# Patient Record
Sex: FEMALE | Race: BLACK OR AFRICAN AMERICAN | NOT HISPANIC OR LATINO | Employment: FULL TIME | URBAN - METROPOLITAN AREA
[De-identification: names, ages, dates, MRNs, and addresses within clinical notes are randomized per-mention and may not be internally consistent; named-entity substitution may affect disease eponyms.]

---

## 2017-02-01 ENCOUNTER — TELEPHONE (OUTPATIENT)
Dept: OBSTETRICS AND GYNECOLOGY | Facility: CLINIC | Age: 42
End: 2017-02-01

## 2017-02-01 NOTE — TELEPHONE ENCOUNTER
----- Message from Ricci Lind MD sent at 2/1/2017  4:04 PM CST -----  Contact: self  Please make an appointment. MUST   CHECK   YOU  FIRST  BEFORE   ORDERING  MAMMOGRAM  ----- Message -----     From: Diamond Zhang MA     Sent: 2/1/2017   3:21 PM       To: Ricci Lind MD    Please submit mammo orders  ----- Message -----     From: Ashlee Del Valle     Sent: 2/1/2017  12:04 PM       To: Lelo Wynne I Staff    Pt request an mammogram order to be placed. Thanks!      Lm in regards to patient needing an appt before Dr Lind can submit Mammo orders. Left office number on voicemail so patient can schedule her annual appt. sal

## 2017-02-07 ENCOUNTER — TELEPHONE (OUTPATIENT)
Dept: OBSTETRICS AND GYNECOLOGY | Facility: CLINIC | Age: 42
End: 2017-02-07

## 2017-02-07 NOTE — TELEPHONE ENCOUNTER
----- Message from Gordon Barton sent at 2/7/2017  1:27 PM CST -----  Contact: Self  Pt called to check status of mammogram orders. Pt can be reached @[phone].

## 2017-02-22 ENCOUNTER — HOSPITAL ENCOUNTER (OUTPATIENT)
Dept: RADIOLOGY | Facility: HOSPITAL | Age: 42
Discharge: HOME | End: 2017-02-22
Attending: OBSTETRICS & GYNECOLOGY
Payer: COMMERCIAL

## 2017-02-22 ENCOUNTER — OFFICE VISIT (OUTPATIENT)
Dept: OBSTETRICS AND GYNECOLOGY | Facility: CLINIC | Age: 42
End: 2017-02-22
Payer: COMMERCIAL

## 2017-02-22 VITALS
WEIGHT: 163.13 LBS | DIASTOLIC BLOOD PRESSURE: 67 MMHG | HEIGHT: 63 IN | SYSTOLIC BLOOD PRESSURE: 123 MMHG | BODY MASS INDEX: 28.9 KG/M2

## 2017-02-22 DIAGNOSIS — Z00.00 ANNUAL PHYSICAL EXAM: Primary | ICD-10-CM

## 2017-02-22 DIAGNOSIS — Z01.419 ENCOUNTER FOR GYNECOLOGICAL EXAMINATION WITHOUT ABNORMAL FINDING: ICD-10-CM

## 2017-02-22 DIAGNOSIS — Z12.31 ENCOUNTER FOR SCREENING MAMMOGRAM FOR BREAST CANCER: ICD-10-CM

## 2017-02-22 DIAGNOSIS — Z11.3 SCREENING FOR STD (SEXUALLY TRANSMITTED DISEASE): ICD-10-CM

## 2017-02-22 DIAGNOSIS — N97.9 FEMALE FERTILITY PROBLEMS: ICD-10-CM

## 2017-02-22 DIAGNOSIS — Z00.00 ANNUAL PHYSICAL EXAM: ICD-10-CM

## 2017-02-22 PROCEDURE — 87480 CANDIDA DNA DIR PROBE: CPT

## 2017-02-22 PROCEDURE — 87591 N.GONORRHOEAE DNA AMP PROB: CPT

## 2017-02-22 PROCEDURE — 77067 SCR MAMMO BI INCL CAD: CPT | Mod: 26,,, | Performed by: RADIOLOGY

## 2017-02-22 PROCEDURE — 99999 PR PBB SHADOW E&M-EST. PATIENT-LVL III: CPT | Mod: PBBFAC,,, | Performed by: OBSTETRICS & GYNECOLOGY

## 2017-02-22 PROCEDURE — 1160F RVW MEDS BY RX/DR IN RCRD: CPT | Mod: S$GLB,,, | Performed by: OBSTETRICS & GYNECOLOGY

## 2017-02-22 PROCEDURE — 99396 PREV VISIT EST AGE 40-64: CPT | Mod: S$GLB,,, | Performed by: OBSTETRICS & GYNECOLOGY

## 2017-02-22 PROCEDURE — 77067 SCR MAMMO BI INCL CAD: CPT | Mod: TC

## 2017-02-22 NOTE — PROGRESS NOTES
Subjective:      Chief Complaint:    Chief Complaint   Patient presents with    Annual Exam       Menstrual History:    OB History      Para Term  AB TAB SAB Ectopic Multiple Living    1         1          Menarche age: 15     Patient's last menstrual period was 02/15/2017 (exact date).           Objective:        History of Present Illness AND  Examination detailed DICTATE:       HISTORY OF PRESENT ILLNESS:  The patient is 42 years of age.   1, para 1.    Here for yearly routine examination.  The patient requested a vaginal culture   screen.    PAST HISTORY:  Medical is negative.  The patient had multiple laparoscopies   because of pelvic scar tissue.  One pregnancy by vaginal delivery,   hystersalpingogram  was negative.  Pap smear  is negative.  The patient   has fertility problem unable to get pregnant.  Had regular normal cycles without   any difficulty.  No more bleeding monthly, so I doubt any ovulatory   dysfunction.  The last exam, I discussed possible evaluation; however, her    was not willing to do evaluation procedure.  The patient is still   interested in pregnancy, the pain as stated that multiple laparoscopies was   found to have pelvic scar tissue, so discussed the problem with the patient.  We   will refer her off for possible diagnostic laparoscopy to evaluate the pelvis   and see if there is any problem that will explain her infertility.      MARTHA  dd: 2017 10:37:10 (CST)  td: 2017 03:42:04 (CST)  Doc ID   #[id number]  Job ID #[id number]    CC:           Assessment:      Diagnosis: ANNUAL   EXAM   FERTILITY   PROBLEM       Plan:      Return in 12  months

## 2017-02-22 NOTE — MR AVS SNAPSHOT
"    [facility] [place] [facility]/ [facility]  [address] [facility] [address]  [address]  [place] [address]  Phone: [phone]                  Arpita Molina   2017 10:20 AM   Office Visit    Description:  Female : 1975   Provider:  Ricci Lind MD   Department:  [facility] [place] - OB/ GYN           Reason for Visit     Annual Exam           Diagnoses this Visit        Comments    Annual physical exam    -  Primary     Encounter for gynecological examination without abnormal finding         Screening for STD (sexually transmitted disease)         Female fertility problems                To Do List           Goals (5 Years of Data)     None      Follow-Up and Disposition     Return in about 2 weeks (around 3/8/2017).      [facility] On [facility]     [facility] On Call Nurse Care Line -  Assistance  Registered nurses in the [facility] provide clinical advisement, health education, appointment booking, and other advisory services.  Call for this free service at [phone].             Medications           Message regarding Medications     Verify the changes and/or additions to your medication regime listed below are the same as discussed with your clinician today.  If any of these changes or additions are incorrect, please notify your healthcare provider.             Verify that the below list of medications is an accurate representation of the medications you are currently taking.  If none reported, the list may be blank. If incorrect, please contact your healthcare provider. Carry this list with you in case of emergency.           Current Medications     M-M-R II, PF, 1,000-12,500 TCID50/0.5 mL injection     PRENATAL VITAMINS-IRON-FA ORAL Take 1 tablet by mouth once daily.             Clinical Reference Information           Your Vitals Were     BP Height Weight Last Period BMI    123/67 5' 3" (1.6 m) 74 kg (163 lb 2.3 oz) 02/15/2017 (Exact Date) 28.9 kg/m2      Blood Pressure          Most Recent Value    BP  123/67    "   Allergies as of 2/22/2017     No Known Allergies      Immunizations Administered on Date of Encounter - 2/22/2017     None      Orders Placed During Today's Visit      Normal Orders This Visit    Ambulatory Referral to Gynecology     C. trachomatis/N. gonorrhoeae by AMP DNA Cervix     Vaginosis Screen by DNA Probe     Future Labs/Procedures Expected by Expires    Mammo Digital Screening Bilat with CAD  2/22/2017 4/22/2018      [facility] Sign-Up     Activating your [facility] account is as easy as 1-2-3!     1) Visit [email or web address], select Sign Up Now, enter this activation code and your date of birth, then select Next.  [email or web address]  Expires: 4/8/2017 10:18 AM      2) Create a username and password to use when you visit [facility] in the future and select a security question in case you lose your password and select Next.    3) Enter your e-mail address and click Sign Up!    Additional Information  If you have questions, please e-mail [email or web address] or call [phone] to talk to our [facility] staff. Remember, [facility] is NOT to be used for urgent needs. For medical emergencies, dial 911.         Language Assistance Services     ATTENTION: Language assistance services are available, free of charge. Please call [phone].      ATENCIÓN: Si habla español, tiene a chase disposición servicios gratuitos de asistencia lingüística. Llame al [phone].     CHÚ Ý: N?u b?n nói Ti?ng Vi?t, có các d?ch v? h? tr? ngôn ng? mi?n phí dành cho b?n. G?i s? [phone].         [facility] - [facility]/ G[facility] complies with applicable Federal civil rights laws and does not discriminate on the basis of race, color, national origin, age, disability, or sex.

## 2017-02-23 LAB
CANDIDA RRNA VAG QL PROBE: NEGATIVE
G VAGINALIS RRNA GENITAL QL PROBE: POSITIVE
T VAGINALIS RRNA GENITAL QL PROBE: NEGATIVE

## 2017-02-24 LAB
C TRACH DNA SPEC QL NAA+PROBE: NEGATIVE
N GONORRHOEA DNA SPEC QL NAA+PROBE: NEGATIVE

## 2017-03-01 ENCOUNTER — TELEPHONE (OUTPATIENT)
Dept: OBSTETRICS AND GYNECOLOGY | Facility: CLINIC | Age: 42
End: 2017-03-01

## 2017-03-01 NOTE — TELEPHONE ENCOUNTER
Notes Recorded by Mazin Schroeder LPN on 3/1/2017 at 9:26 AM  CALL ATTEMPT TO MS ANDINO UNSUCCESSFUL, SHE HAS A VOICE MAIL BOX THAT HAS NOT BEEN SET UP, UNABLE TO LEAVE MESSAGE  ------

## 2017-03-01 NOTE — TELEPHONE ENCOUNTER
Notes Recorded by Mazin Schroeder LPN on 3/1/2017 at 10:06 AM  MS ANDINO RETURNED CALL TO OFFICE, INFORMED HER THAT DR SOMERS READ HER VAGINOSIS SCREEN AND IT IS NORMAL, PT STATED HER UNDERSTANDING  ------

## 2017-03-01 NOTE — TELEPHONE ENCOUNTER
Notes Recorded by Mazin Schroeder LPN on 3/1/2017 at 10:21 AM  SPOKE WITH MS SINA, INFORMED HER DR SOMERS READ HER MMG RESULTS AND THEY ARE NORMAL, PT STATED HER UNDERSTANDING  ------

## 2017-03-03 ENCOUNTER — OFFICE VISIT (OUTPATIENT)
Dept: OBSTETRICS AND GYNECOLOGY | Facility: CLINIC | Age: 42
End: 2017-03-03
Payer: COMMERCIAL

## 2017-03-03 VITALS
WEIGHT: 160 LBS | BODY MASS INDEX: 28.35 KG/M2 | SYSTOLIC BLOOD PRESSURE: 110 MMHG | DIASTOLIC BLOOD PRESSURE: 70 MMHG | HEIGHT: 63 IN | TEMPERATURE: 99 F

## 2017-03-03 DIAGNOSIS — Z31.61 PROCREATIVE COUNSELING AND ADVICE USING NATURAL FAMILY PLANNING: Primary | ICD-10-CM

## 2017-03-03 DIAGNOSIS — N80.9 ENDOMETRIOSIS DETERMINED BY LAPAROSCOPY: ICD-10-CM

## 2017-03-03 PROCEDURE — 99999 PR PBB SHADOW E&M-EST. PATIENT-LVL III: CPT | Mod: PBBFAC,,, | Performed by: OBSTETRICS & GYNECOLOGY

## 2017-03-03 PROCEDURE — 1160F RVW MEDS BY RX/DR IN RCRD: CPT | Mod: S$GLB,,, | Performed by: OBSTETRICS & GYNECOLOGY

## 2017-03-03 PROCEDURE — 99213 OFFICE O/P EST LOW 20 MIN: CPT | Mod: S$GLB,,, | Performed by: OBSTETRICS & GYNECOLOGY

## 2017-03-03 RX ORDER — ESTRADIOL 1 MG/1
3 TABLET ORAL EVERY 12 HOURS
Qty: 27 TABLET | Refills: 3 | Status: SHIPPED | OUTPATIENT
Start: 2017-03-03 | End: 2017-07-15

## 2017-03-03 RX ORDER — CLOMIPHENE CITRATE 50 MG/1
50 TABLET ORAL DAILY
Qty: 5 TABLET | Refills: 3 | Status: SHIPPED | OUTPATIENT
Start: 2017-03-03 | End: 2017-07-15

## 2017-03-03 NOTE — PROGRESS NOTES
Subjective:       Patient ID: Arpita Molina is a 42 y.o. female.    Chief Complaint:  Consult (Consult on surgery)      History of Present Illness  HPI  Patient referred by Dr Lelo Magaña history of endometriosis.  Status post laparoscopy x 2 with ablation of endometriotic implants.  Found to have some pelvic adhesions  HSG done on 2015 shows bilateral patent tubes   and patient with one child in .  No pain today.  Trying to get pregnant.    GYN & OB History  Patient's last menstrual period was 2017 (exact date).   Date of Last Pap: 2015    OB History    Para Term  AB SAB TAB Ectopic Multiple Living   1 1 1       1      # Outcome Date GA Lbr Livan/2nd Weight Sex Delivery Anes PTL Lv   1 Term 13 40w0d  3.714 kg (8 lb 3 oz) M Vag-Spont EPI N Y        Past Medical History:   Diagnosis Date    Anemia     not currently taking anything for it    Endometriosis        Past Surgical History:   Procedure Laterality Date    laproscopy  ,     for endometriosis    VAGINAL DELIVERY         Family History   Problem Relation Age of Onset    Hypertension Mother     Diabetes Mother        Social History     Social History    Marital status:      Spouse name: N/A    Number of children: N/A    Years of education: N/A     Social History Main Topics    Smoking status: Never Smoker    Smokeless tobacco: Never Used    Alcohol use No    Drug use: No    Sexual activity: Yes     Partners: Male     Other Topics Concern    None     Social History Narrative    He is in Purchasing/Procurement for [employer]    She is working in [employer]     x 6 years, since        Current Outpatient Prescriptions   Medication Sig Dispense Refill    clomiPHENE (CLOMID) 50 mg tablet Take 1 tablet (50 mg total) by mouth once daily. 5 tablet 3    estradiol (ESTRACE) 1 MG tablet Take 3 tablets (3 mg total) by mouth every 12 (twelve) hours. 27 tablet 3     No current  facility-administered medications for this visit.        Review of patient's allergies indicates:  No Known Allergies    Review of Systems  Review of Systems   Constitutional: Negative for activity change, appetite change, chills, fatigue, fever and unexpected weight change.   HENT: Negative for mouth sores.    Respiratory: Negative for cough, shortness of breath and wheezing.    Cardiovascular: Negative for chest pain and palpitations.   Gastrointestinal: Negative for abdominal pain, bloating, blood in stool, constipation, nausea and vomiting.   Endocrine: Negative for diabetes and hot flashes.   Genitourinary: Positive for dyspareunia, pelvic pain and dysmenorrhea. Negative for dysuria, frequency, hematuria, menorrhagia, menstrual problem, urgency, vaginal bleeding, vaginal discharge, vaginal pain, urinary incontinence, postcoital bleeding and vaginal odor.   Musculoskeletal: Negative for back pain and myalgias.   Skin:  Negative for rash.   Neurological: Negative for seizures and headaches.   Psychiatric/Behavioral: Negative for depression and sleep disturbance. The patient is not nervous/anxious.    Breast: Negative for breast mass, breast pain and nipple discharge          Objective:    Physical Exam:   Constitutional: She appears well-developed and well-nourished. No distress.    HENT:   Head: Normocephalic and atraumatic.    Eyes: EOM are normal.    Neck: Normal range of motion.    Cardiovascular: Normal rate.     Pulmonary/Chest: Effort normal. No respiratory distress.                  Musculoskeletal: Normal range of motion.       Neurological: She is alert.    Skin: Skin is warm and dry.    Psychiatric: She has a normal mood and affect.          Assessment:        1. Procreative counseling and advice using natural family planning    2. Endometriosis determined by laparoscopy              Plan:      I have extensively discussed with the patient regarding her condition  She has no pain at this time.  She  would like to get pregnant soon.  She has been trying for the past 6-7 months.  We discussed laparoscopy.  I was not sure if we would make a big difference with surgery with patient basically asymptomatic and her recent HSG with patent tubes.  Her three-and-half years-old child was with her .  She is now 42 years old.  I think we should try her on clomiphene to see if we can help with ovulation and timing of intercourse   After extensive discussion, clomiphene given with specific instructions on how to take pills.    She will be back for follow-up in about 3 months or when she has a positive pregnancy test.  She will continue with over-the-counter prenatal vitamins.

## 2017-03-03 NOTE — LETTER
March 3, 2017      Ricci Lind MD  [address]  [address]  [place] [address]           [facility] [place] - [facility]/ GYN  [address]  [address]  [place] [address]  Phone: [phone]          Patient: Arpita Molina   MR Number: [id number]   YOB: 1975   Date of Visit: 3/3/2017       Dear Dr. Ricci Lind:    Thank you for referring Arpita Molina to me for evaluation. Attached you will find relevant portions of my assessment and plan of care.    If you have questions, please do not hesitate to call me. I look forward to following Arpita Molina along with you.    Sincerely,    Thiago Schmidt MD    Enclosure  CC:  No Recipients    If you would like to receive this communication electronically, please contact [email or web address] or [phone] to request more information on [employer] Link access.    For providers and/or their staff who would like to refer a patient to [facility], please contact us through our one-stop-shop provider referral line, [facility], at [phone].    If you feel you have received this communication in error or would no longer like to receive these types of communications, please e-mail [email or web address]

## 2017-03-03 NOTE — MR AVS SNAPSHOT
[facility] - OB/ GYN  [address]  [address]  [place] [address]  Phone: [phone]                  Arpita Molina   3/3/2017 9:00 AM   Office Visit    Description:  Female : 1975   Provider:  Thiago Schmidt MD   Department:  [facility] - OB/ GYN           Reason for Visit     Consult           Diagnoses this Visit        Comments    Procreative counseling and advice using natural family planning    -  Primary     Endometriosis determined by laparoscopy                To Do List           Goals (5 Years of Data)     None      Follow-Up and Disposition     Return in about 3 months (around 6/3/2017).       These Medications        Disp Refills Start End    clomiPHENE (CLOMID) 50 mg tablet 5 tablet 3 3/3/2017     Take 1 tablet (50 mg total) by mouth once daily. - Oral    Pharmacy: [id number] - [place], [place] - [address] Ph #: [phone]       Notes to Pharmacy: From cycle day #3-7    estradiol (ESTRACE) 1 MG tablet 27 tablet 3 3/3/2017     Take 3 tablets (3 mg total) by mouth every 12 (twelve) hours. - Oral    Pharmacy: [facility] [address] [place] [address] [address] Ph #: [phone]       Notes to Pharmacy: From cycle day #8 through the morning of cycle day #12      [facility] On Call     [facility] On Call Nurse Care Line -  Assistance  Registered nurses in the [facility] provide clinical advisement, health education, appointment booking, and other advisory services.  Call for this free service at [phone].             Medications           Message regarding Medications     Verify the changes and/or additions to your medication regime listed below are the same as discussed with your clinician today.  If any of these changes or additions are incorrect, please notify your healthcare provider.        START taking these NEW medications        Refills    clomiPHENE (CLOMID) 50 mg tablet 3    Sig: Take 1 tablet (50 mg total) by mouth once daily.     "Class: Normal    Route: Oral    estradiol (ESTRACE) 1 MG tablet 3    Sig: Take 3 tablets (3 mg total) by mouth every 12 (twelve) hours.    Class: Normal    Route: Oral      STOP taking these medications     M-M-R II, PF, 1,000-12,500 TCID50/0.5 mL injection     PRENATAL VITAMINS-IRON-FA ORAL Take 1 tablet by mouth once daily.             Verify that the below list of medications is an accurate representation of the medications you are currently taking.  If none reported, the list may be blank. If incorrect, please contact your healthcare provider. Carry this list with you in case of emergency.           Current Medications     clomiPHENE (CLOMID) 50 mg tablet Take 1 tablet (50 mg total) by mouth once daily.    estradiol (ESTRACE) 1 MG tablet Take 3 tablets (3 mg total) by mouth every 12 (twelve) hours.           Clinical Reference Information           Your Vitals Were     BP Temp Height Weight Last Period BMI    110/70 (BP Location: Right arm, Patient Position: Sitting, BP Method: Manual) 98.6 °F (37 °C) (Oral) 5' 3" (1.6 m) 72.6 kg (160 lb) 02/09/2017 (Exact Date) 28.34 kg/m2      Blood Pressure          Most Recent Value    BP  110/70      Allergies as of 3/3/2017     No Known Allergies      Immunizations Administered on Date of Encounter - 3/3/2017     None      Language Assistance Services     ATTENTION: Language assistance services are available, free of charge. Please call [phone].      ATENCIÓN: Si habla español, tiene a chase disposición servicios gratuitos de asistencia lingüística. Llame al [phone].     [facility] Ý: N?u b?n nói Ti?ng Vi?t, có các d?ch v? h? tr? ngôn ng? mi?n phí dành cho b?n. G?i s? [phone].         [facility] complies with applicable Federal civil rights laws and does not discriminate on the basis of race, color, national origin, age, disability, or sex.        "

## 2017-05-29 PROBLEM — Z00.00 ANNUAL PHYSICAL EXAM: Status: RESOLVED | Noted: 2017-02-22 | Resolved: 2017-05-29

## 2017-07-14 ENCOUNTER — NURSE TRIAGE (OUTPATIENT)
Dept: ADMINISTRATIVE | Facility: CLINIC | Age: 42
End: 2017-07-14

## 2017-07-14 ENCOUNTER — TELEPHONE (OUTPATIENT)
Dept: OBSTETRICS AND GYNECOLOGY | Facility: CLINIC | Age: 42
End: 2017-07-14

## 2017-07-14 NOTE — TELEPHONE ENCOUNTER
Reason for Disposition   Patient wants to be seen    Protocols used: ST VAGINAL BLEEDING - [id number]    Pt reports abdnormal vaginal bleeding in between periods. Her menstrual cycle is normally very regular. Heavy bleeding started this morning. Denies severe or constant abdominal but does report intermittent cramping. Denies dizziness.    Per protocol patient advised to follow up with GYN today

## 2017-07-15 ENCOUNTER — HOSPITAL ENCOUNTER (EMERGENCY)
Facility: HOSPITAL | Age: 42
Discharge: HOME | End: 2017-07-15
Attending: EMERGENCY MEDICINE
Payer: COMMERCIAL

## 2017-07-15 VITALS
WEIGHT: 160 LBS | DIASTOLIC BLOOD PRESSURE: 84 MMHG | HEART RATE: 74 BPM | BODY MASS INDEX: 28.35 KG/M2 | SYSTOLIC BLOOD PRESSURE: 138 MMHG | OXYGEN SATURATION: 99 % | RESPIRATION RATE: 17 BRPM | HEIGHT: 63 IN | TEMPERATURE: 98 F

## 2017-07-15 DIAGNOSIS — N93.8 DYSFUNCTIONAL UTERINE BLEEDING: Primary | ICD-10-CM

## 2017-07-15 LAB
B-HCG UR QL: NEGATIVE
BACTERIA #/AREA URNS HPF: ABNORMAL /HPF
BACTERIA GENITAL QL WET PREP: ABNORMAL
BASOPHILS # BLD AUTO: 0.01 K/UL
BASOPHILS NFR BLD: 0.2 %
BILIRUB UR QL STRIP: NEGATIVE
CLARITY UR: ABNORMAL
CLUE CELLS VAG QL WET PREP: ABNORMAL
COLOR UR: YELLOW
CTP QC/QA: YES
DIFFERENTIAL METHOD: NORMAL
EOSINOPHIL # BLD AUTO: 0 K/UL
EOSINOPHIL NFR BLD: 0.5 %
ERYTHROCYTE [DISTWIDTH] IN BLOOD BY AUTOMATED COUNT: 12.5 %
FILAMENT FUNGI VAG WET PREP-#/AREA: ABNORMAL
GLUCOSE UR QL STRIP: NEGATIVE
HCT VFR BLD AUTO: 37.4 %
HGB BLD-MCNC: 12.5 G/DL
HGB UR QL STRIP: ABNORMAL
KETONES UR QL STRIP: NEGATIVE
LEUKOCYTE ESTERASE UR QL STRIP: NEGATIVE
LYMPHOCYTES # BLD AUTO: 1.6 K/UL
LYMPHOCYTES NFR BLD: 24.4 %
MCH RBC QN AUTO: 30.2 PG
MCHC RBC AUTO-ENTMCNC: 33.4 %
MCV RBC AUTO: 90 FL
MICROSCOPIC COMMENT: ABNORMAL
MONOCYTES # BLD AUTO: 0.6 K/UL
MONOCYTES NFR BLD: 9.5 %
NEUTROPHILS # BLD AUTO: 4.4 K/UL
NEUTROPHILS NFR BLD: 65.2 %
NITRITE UR QL STRIP: NEGATIVE
PH UR STRIP: 8 [PH] (ref 5–8)
PLATELET # BLD AUTO: 323 K/UL
PMV BLD AUTO: 11.2 FL
PROT UR QL STRIP: NEGATIVE
RBC # BLD AUTO: 4.14 M/UL
RBC #/AREA URNS HPF: >100 /HPF (ref 0–4)
SP GR UR STRIP: 1.02 (ref 1–1.03)
SPECIMEN SOURCE: ABNORMAL
SQUAMOUS #/AREA URNS HPF: 3 /HPF
T VAGINALIS GENITAL QL WET PREP: ABNORMAL
URN SPEC COLLECT METH UR: ABNORMAL
UROBILINOGEN UR STRIP-ACNC: NEGATIVE EU/DL
WBC # BLD AUTO: 6.65 K/UL
WBC #/AREA VAG WET PREP: ABNORMAL
YEAST GENITAL QL WET PREP: ABNORMAL

## 2017-07-15 PROCEDURE — 81000 URINALYSIS NONAUTO W/SCOPE: CPT

## 2017-07-15 PROCEDURE — 87591 N.GONORRHOEAE DNA AMP PROB: CPT

## 2017-07-15 PROCEDURE — 87210 SMEAR WET MOUNT SALINE/INK: CPT

## 2017-07-15 PROCEDURE — 99284 EMERGENCY DEPT VISIT MOD MDM: CPT | Mod: 25

## 2017-07-15 PROCEDURE — 85025 COMPLETE CBC W/AUTO DIFF WBC: CPT

## 2017-07-15 PROCEDURE — 81025 URINE PREGNANCY TEST: CPT | Performed by: EMERGENCY MEDICINE

## 2017-07-15 NOTE — ED PROVIDER NOTES
Encounter Date: 7/15/2017    SCRIBE #1 NOTE: I, Caron Dailey, am scribing for, and in the presence of, Janette Gooden PA-C. Other sections scribed: HPI/ROS.       History     Chief Complaint   Patient presents with    Vaginal Bleeding     Pt. presents with abnormal vaginal bleeding that began last Thursday. Pt. reports that her cycle began and then should have ended but continued. Pt. states bleeding is not heavy and using a pad every 2 hrs.      CC: Vaginal bleeding    Pt is a 42 y.o. female w/ anemia and hx of endometriosis presenting to the ED c/o abnormal vaginal bleeding. Pt's normal menstrual cycle ended last Wednesday, but she began bleeding this Thursday (7/13/17). Pt reports that it is like a normal period. Pt also reports associated weakness and lack of appetite, as well as some mild lower back pain and mild cramping (2/10).     Pt reports when she had endometriosis in the past, it never caused her to bleed. Pt sees OB/GYN Dr. Schmidt, who told her in January that she has a pea-sized fibroid found via U/S. Pt reports her menstrual cycle is very regular, every 26 days. Pt denies any new sex partners, fever, or N/V.        The history is provided by the patient.     Review of patient's allergies indicates:  No Known Allergies  Past Medical History:   Diagnosis Date    Anemia     not currently taking anything for it    Endometriosis      Past Surgical History:   Procedure Laterality Date    laproscopy  2003, 2009    for endometriosis    VAGINAL DELIVERY       Family History   Problem Relation Age of Onset    Hypertension Mother     Diabetes Mother      Social History   Substance Use Topics    Smoking status: Never Smoker    Smokeless tobacco: Never Used    Alcohol use Yes      Comment: occasionally drinks wine     Review of Systems   Constitutional: Positive for appetite change (+) decreased. Negative for fever.   HENT: Negative for rhinorrhea and sinus pressure.    Eyes: Negative for visual  disturbance.   Respiratory: Negative for chest tightness.    Cardiovascular: Negative for chest pain.   Gastrointestinal: Negative for diarrhea, nausea and vomiting.   Genitourinary: Positive for vaginal bleeding. Negative for dysuria and hematuria.        (+) mild cramping   Musculoskeletal: Positive for back pain.   Skin: Negative for rash and wound.   Neurological: Positive for weakness. Negative for headaches.       Physical Exam     Initial Vitals [07/15/17 1159]   BP Pulse Resp Temp SpO2   (!) 147/72 76 17 97.8 °F (36.6 °C) 99 %      MAP       97         Physical Exam    ED Course   Procedures  Labs Reviewed   POCT URINE PREGNANCY                        Scribe Attestation:   Scribe #1: I performed the above scribed service and the documentation accurately describes the services I performed. I attest to the accuracy of the note.    Attending Attestation:           Physician Attestation for Scribe:  Physician Attestation Statement for Scribe #1: I, Janette Gooden PA-C, reviewed documentation, as scribed by Caron Dailey in my presence, and it is both accurate and complete.                 ED Course     Clinical Impression:   There were no encounter diagnoses.

## 2017-07-15 NOTE — DISCHARGE INSTRUCTIONS
Follow up with your OB/GYN.  Return to the ED for vaginal bleeding greater than one pad per hour.  Drink plenty of fluids.

## 2017-07-15 NOTE — ED TRIAGE NOTES
Pt. Comes to the ER with abnormal vaginal bleeding. Patient states her cycled stopped last Wednesday (7/5/17). Patient states she started bleeding again this Thursday (7/13). Patient complains of mild cramping pains. Patient states the blood is a normal cycle. Patient states she is going through 1 pad every 1.5-2 hours.    Patient OBGYN is Lelo (here at [facility]).

## 2017-07-15 NOTE — ED PROVIDER NOTES
Encounter Date: 7/15/2017    SCRIBE #1 NOTE: I, Caron Dailey, am scribing for, and in the presence of, Janette Gooden PA-C. Other sections scribed: HPI/ROS.       History     Chief Complaint   Patient presents with    Vaginal Bleeding     Pt. presents with abnormal vaginal bleeding that began last Thursday. Pt. reports that her cycle began and then should have ended but continued. Pt. states bleeding is not heavy and using a pad every 2 hrs.      CC: Vaginal bleeding     Pt is a 42 y.o. female w/ anemia and hx of endometriosis presenting to the ED c/o abnormal vaginal bleeding. Pt's normal menstrual cycle ended last Wednesday, but she began bleeding this Thursday (7/13/17). Pt reports that it is like a normal period. Pt also reports associated weakness and lack of appetite, as well as some mild lower back pain and mild cramping (2/10).      Pt reports when she had endometriosis in the past, it never caused her to bleed. Pt sees OB/GYN Dr. Schmidt, who told her in January that she has a pea-sized fibroid found via U/S. Pt reports her menstrual cycle is very regular, every 26 days. Pt denies any new sex partners, fever, or N/V.         The history is provided by the patient.     Review of patient's allergies indicates:  No Known Allergies  Past Medical History:   Diagnosis Date    Anemia     not currently taking anything for it    Endometriosis      Past Surgical History:   Procedure Laterality Date    laproscopy  2003, 2009    for endometriosis    VAGINAL DELIVERY       Family History   Problem Relation Age of Onset    Hypertension Mother     Diabetes Mother      Social History   Substance Use Topics    Smoking status: Never Smoker    Smokeless tobacco: Never Used    Alcohol use Yes      Comment: occasionally drinks wine     Review of Systems   Constitutional: Positive for appetite change (+) decreased.   HENT: Negative for rhinorrhea and sore throat.    Eyes: Negative for visual disturbance.    Respiratory: Negative for shortness of breath.    Cardiovascular: Negative for chest pain.   Gastrointestinal: Negative for diarrhea, nausea and vomiting.   Genitourinary: Positive for vaginal bleeding.        (+) mild cramping   Musculoskeletal: Positive for back pain.   Skin: Negative for rash and wound.   Neurological: Positive for weakness.       Physical Exam     Initial Vitals [07/15/17 1159]   BP Pulse Resp Temp SpO2   (!) 147/72 76 17 97.8 °F (36.6 °C) 99 %      MAP       97         Physical Exam    Nursing note and vitals reviewed.  Constitutional: She appears well-developed and well-nourished. She is not diaphoretic. No distress.   HENT:   Head: Normocephalic and atraumatic.   Right Ear: External ear normal.   Left Ear: External ear normal.   Nose: Nose normal.   Mouth/Throat: Oropharynx is clear and moist.   Eyes: EOM are normal. Pupils are equal, round, and reactive to light.   Neck: Normal range of motion. Neck supple.   Cardiovascular: Normal rate and regular rhythm.   No murmur heard.  Pulmonary/Chest: Breath sounds normal. No respiratory distress. She has no wheezes. She has no rhonchi. She has no rales.   Abdominal: Soft. Bowel sounds are normal. She exhibits no distension. There is no tenderness. There is no rebound and no guarding.   Genitourinary:   Genitourinary Comments: There is a small amount of blood noted in the vaginal vault.  There is no hemorrhaging noted.  There are no external genital lesions noted.  The cervix is closed.   Musculoskeletal: Normal range of motion.   Neurological: She is alert and oriented to person, place, and time. No cranial nerve deficit or sensory deficit.   Skin: Skin is warm. No rash noted. No erythema.         ED Course   Procedures  Labs Reviewed   VAGINAL SCREEN - Abnormal; Notable for the following:        Result Value    WBC - Vaginal Screen Rare (*)     Bacteria - Vaginal Screen Few (*)     All other components within normal limits   URINALYSIS -  Abnormal; Notable for the following:     Appearance, UA Hazy (*)     Occult Blood UA 3+ (*)     All other components within normal limits   URINALYSIS MICROSCOPIC - Abnormal; Notable for the following:     RBC, UA >100 (*)     All other components within normal limits   C. TRACHOMATIS/N. GONORRHOEAE BY AMP DNA   CBC W/ AUTO DIFFERENTIAL   POCT URINE PREGNANCY             Medical Decision Making:   Differential Diagnosis:   This is an urgent evaluation of a 42-year-old female who presents to the emergency department complaining of vaginal bleeding.  She states that her period ended one week ago and she started bleeding again on Thursday.    Previous medical records were obtained and reviewed.  This patient has a history of anemia and endometriosis.    The patient is currently afebrile and nontoxic in appearance.  Her blood pressure is mildly elevated at 147/72.  On physical exam, there is no abdominal tenderness that could be elicited.  There is no CVA tenderness noted.  On pelvic exam, there is a small amount of blood noted in the vaginal vault.  There is no hemorrhaging noted.  There are no external genital lesions noted.  The cervix is closed.  The remaining physical exam is unremarkable.  A CBC was performed which revealed no leukocytosis or anemia.  Vaginal screen is with rare bacteria and few white blood cells.  Gen-Probe for gonorrhea and chlamydia was sent and is pending.  On urinalysis there is no evidence of urinary tract infection.  Urine pregnancy test is negative.  I carefully considered but doubt ectopic pregnancy, tubo-ovarian abscess, ovarian torsion, uterine fibroids.  I'm unsure the etiology of this patient's dysfunctional vaginal bleeding.  However, she is hemodynamically stable and can be discharge at this time.  She will need to follow-up with her OB/GYN.  The patient verbalized understanding and agreement and the treatment plan and all questions were answered.  She stable for discharge at this  time.              Scribe Attestation:   Scribe #1: I performed the above scribed service and the documentation accurately describes the services I performed. I attest to the accuracy of the note.    Attending Attestation:           Physician Attestation for Scribe:  Physician Attestation Statement for Scribe #1: I, Janette Gooden PA-C, reviewed documentation, as scribed by Caron Dailey in my presence, and it is both accurate and complete.                 ED Course     Clinical Impression:   The encounter diagnosis was Dysfunctional uterine bleeding.    Disposition:   Disposition: Discharged  Condition: Stable                        Janette Gooden PA-C  07/15/17 0847

## 2017-07-17 LAB
C TRACH DNA SPEC QL NAA+PROBE: NOT DETECTED
N GONORRHOEA DNA SPEC QL NAA+PROBE: NOT DETECTED

## 2017-08-24 ENCOUNTER — OFFICE VISIT (OUTPATIENT)
Dept: OBSTETRICS AND GYNECOLOGY | Facility: CLINIC | Age: 42
End: 2017-08-24
Payer: COMMERCIAL

## 2017-08-24 ENCOUNTER — HOSPITAL ENCOUNTER (OUTPATIENT)
Dept: RADIOLOGY | Facility: HOSPITAL | Age: 42
Discharge: HOME | End: 2017-08-24
Attending: OBSTETRICS & GYNECOLOGY
Payer: COMMERCIAL

## 2017-08-24 VITALS
DIASTOLIC BLOOD PRESSURE: 73 MMHG | HEIGHT: 63 IN | BODY MASS INDEX: 28.42 KG/M2 | SYSTOLIC BLOOD PRESSURE: 115 MMHG | WEIGHT: 160.38 LBS

## 2017-08-24 DIAGNOSIS — N93.8 DUB (DYSFUNCTIONAL UTERINE BLEEDING): ICD-10-CM

## 2017-08-24 DIAGNOSIS — N91.2 AMENORRHEA: ICD-10-CM

## 2017-08-24 DIAGNOSIS — N95.1 MENOPAUSAL STATE: ICD-10-CM

## 2017-08-24 DIAGNOSIS — Z01.419 ENCOUNTER FOR GYNECOLOGICAL EXAMINATION WITHOUT ABNORMAL FINDING: Primary | ICD-10-CM

## 2017-08-24 LAB
B-HCG UR QL: NEGATIVE
CTP QC/QA: YES

## 2017-08-24 PROCEDURE — 81025 URINE PREGNANCY TEST: CPT | Mod: QW,S$GLB,, | Performed by: OBSTETRICS & GYNECOLOGY

## 2017-08-24 PROCEDURE — 76856 US EXAM PELVIC COMPLETE: CPT | Mod: 26,,, | Performed by: RADIOLOGY

## 2017-08-24 PROCEDURE — 99999 PR PBB SHADOW E&M-EST. PATIENT-LVL III: CPT | Mod: PBBFAC,,, | Performed by: OBSTETRICS & GYNECOLOGY

## 2017-08-24 PROCEDURE — 3008F BODY MASS INDEX DOCD: CPT | Mod: S$GLB,,, | Performed by: OBSTETRICS & GYNECOLOGY

## 2017-08-24 PROCEDURE — 76856 US EXAM PELVIC COMPLETE: CPT | Mod: TC

## 2017-08-24 PROCEDURE — 76830 TRANSVAGINAL US NON-OB: CPT | Mod: 26,,, | Performed by: RADIOLOGY

## 2017-08-24 PROCEDURE — 99214 OFFICE O/P EST MOD 30 MIN: CPT | Mod: S$GLB,,, | Performed by: OBSTETRICS & GYNECOLOGY

## 2017-08-24 NOTE — PROGRESS NOTES
Subjective:      Chief Complaint:    Chief Complaint   Patient presents with    Amenorrhea       Menstrual History:    OB History      Para Term  AB Living    1 1 1     1    SAB TAB Ectopic Multiple Live Births            1          Menarche age: 15     No LMP recorded.           Objective:        History of Present Illness AND  Examination detailed DICTATE:     HISTORY OF PRESENT ILLNESS:  The patient is a 42-year-old,  1, para 1.    The patient presented to the clinic because of abnormal dysfunctional bleeding.    The patient stated that her last menstrual period was 2017 and started   the bleeding about seven days later, moderate amount of bleeding, denies any   cramping or pain.  She took a home pregnancy test, which apparently was   positive.  Past history, the patient had problem with pelvic obstructive   disease, endometriosis, multiple laparoscopies, exploratory lap and   neosalpingostomy, subsequent pregnancy.  Cycles prior to July was regular normal   without any problem, pain, discomfort.  As stated, urine pregnancy test in the   office here is negative.    PHYSICAL EXAMINATION:  GENERAL:  Blood pressure 115/73, weight 160 pounds.  ABDOMEN:  Soft.  No guarding, rebound or tenderness.  PELVIC:  External normal.  Vulva normal.  Bartholin, urethral and [place] glands   are negative.  Vagina clear.  Cervix clear.  Uterus slightly enlarged,   irregular.  Both ovaries are palpable and negative.  Good pelvic support.  No   pain elicited on examination.  RECTAL:  External negative.    IMPRESSION:  Dysfunctional bleeding.  Slightly enlarged ovary.    PLAN:  We will do a pelvic ultrasound to evaluate the uterus to rule out the   possibility of endometrial problem or possible early fibroid, and we will decide   if followup or treatment is indicated; however, at the present time, I do not   think we need to intervene.      MARTHA  dd: 2017 09:07:51 (CDT)  td: 2017 23:59:11  (CDT)  Doc ID   #[id number]  Job ID #[id number]    CC:             Assessment:      Diagnosis: amenorrhea   DUB    MENOPAUSA       Plan:      Return in 4  weeks

## 2017-08-30 ENCOUNTER — OFFICE VISIT (OUTPATIENT)
Dept: OBSTETRICS AND GYNECOLOGY | Facility: CLINIC | Age: 42
End: 2017-08-30
Payer: COMMERCIAL

## 2017-08-30 VITALS
WEIGHT: 160 LBS | HEIGHT: 63 IN | DIASTOLIC BLOOD PRESSURE: 73 MMHG | SYSTOLIC BLOOD PRESSURE: 120 MMHG | BODY MASS INDEX: 28.35 KG/M2

## 2017-08-30 DIAGNOSIS — N97.9 FEMALE FERTILITY PROBLEMS: Primary | ICD-10-CM

## 2017-08-30 DIAGNOSIS — N95.9 MENOPAUSAL AND PERIMENOPAUSAL DISORDER: ICD-10-CM

## 2017-08-30 DIAGNOSIS — N91.2 AMENORRHEA: ICD-10-CM

## 2017-08-30 PROCEDURE — 99999 PR PBB SHADOW E&M-EST. PATIENT-LVL III: CPT | Mod: PBBFAC,,, | Performed by: OBSTETRICS & GYNECOLOGY

## 2017-08-30 PROCEDURE — 99212 OFFICE O/P EST SF 10 MIN: CPT | Mod: S$GLB,,, | Performed by: OBSTETRICS & GYNECOLOGY

## 2017-08-30 PROCEDURE — 3008F BODY MASS INDEX DOCD: CPT | Mod: S$GLB,,, | Performed by: OBSTETRICS & GYNECOLOGY

## 2017-08-30 RX ORDER — MEDROXYPROGESTERONE ACETATE 10 MG/1
10 TABLET ORAL DAILY
Qty: 10 TABLET | Refills: 0 | Status: SHIPPED | OUTPATIENT
Start: 2017-08-30 | End: 2019-05-15

## 2017-08-30 NOTE — PROGRESS NOTES
Subjective:      Chief Complaint:    Chief Complaint   Patient presents with    Results       Menstrual History:    OB History      Para Term  AB Living    1 1 1     1    SAB TAB Ectopic Multiple Live Births            1          Menarche age: 15     Patient's last menstrual period was 2017.           Objective:        History of Present Illness AND  Examination detailed DICTATE:       The patient is 42 years of age.  Was seen here recently because of menstrual   irregularities.  The patient's examination is essentially normal.  The patient,   however, received the benefit of a pregnancy test, which was negative.  Pelvic   ultrasound, which is within normal limit, FSH 37.20, estrogen 134 and thyroid,   which was normal.  The patient in the past had surgery because of tubal   obstruction, neosalpingostomy; however, hysterosalpingogram in  indicated   bilateral patent tubes.  According to the studies, the patient is menopausal or   perimenopausal.  Her last menstrual cycle started on 2017.  The patient is   still interested in pregnancy.  Discussed treatment, followup for the   condition.  However, she would like to see if Clomid stimulation might be   effective and to possibly initiate pregnancy.    PLAN:  We will put the patient on Provera to start the cycle.  The patient will   come in while she is on her cycle and then depending of the nature of the cycle,   we will decide if Clomid stimulation would be indicated and possibly would be   successful.  So, plan to initiate cycle and then followup from that.      ANGEL/IN  dd: 2017 13:15:00 (CDT)  td: 2017 04:49:24 (CDT)  Doc ID   #[id number]  Job ID #[id number]    CC:           Assessment:      Diagnosis: fertility   Problem   MONICO   MENOPAUSAL           Plan:      Return in 2  weeks

## 2017-09-14 ENCOUNTER — TELEPHONE (OUTPATIENT)
Dept: OBSTETRICS AND GYNECOLOGY | Facility: CLINIC | Age: 42
End: 2017-09-14

## 2017-09-14 NOTE — TELEPHONE ENCOUNTER
"----- Message from Ricci Lind MD sent at 9/14/2017  1:05 PM CDT -----  Contact: self  Please make an appointment. I would like to see you in my office.   ----- Message -----  From: iDamond Zhang MA  Sent: 9/14/2017  11:18 AM  To: Ricci Lind MD    Please advise. Patient was seen on 8/30/2017 and does not want to come in again if not needed.   ----- Message -----  From: Crys Pizarro  Sent: 9/14/2017  10:44 AM  To: Lelo Wynne I Staff    Patient said" the medication medroxy progester did not give her a cycle". Can doctor call something in stronger? Patient can be reached at [phone].      Thanks,    Patient will be seen on tues 9/19/2019 at 10:30 per Dr Lind. monserrat      "

## 2017-11-20 ENCOUNTER — TELEPHONE (OUTPATIENT)
Dept: OBSTETRICS AND GYNECOLOGY | Facility: CLINIC | Age: 42
End: 2017-11-20

## 2017-11-20 NOTE — TELEPHONE ENCOUNTER
----- Message from Mary Morgan sent at 11/20/2017 12:01 PM CST -----  Patient states that her cycle is back normal for 2 months and she is requesting coumadin &  estrogen. She can be reached at [phone]. Thank you!

## 2018-07-31 ENCOUNTER — OFFICE VISIT (OUTPATIENT)
Dept: OBSTETRICS AND GYNECOLOGY | Facility: CLINIC | Age: 43
End: 2018-07-31
Payer: COMMERCIAL

## 2018-07-31 VITALS
SYSTOLIC BLOOD PRESSURE: 115 MMHG | DIASTOLIC BLOOD PRESSURE: 67 MMHG | HEIGHT: 63 IN | BODY MASS INDEX: 29.54 KG/M2 | WEIGHT: 166.69 LBS

## 2018-07-31 DIAGNOSIS — N64.4 BILATERAL MASTODYNIA: Primary | ICD-10-CM

## 2018-07-31 DIAGNOSIS — R92.30 DENSE BREAST TISSUE ON MAMMOGRAM: ICD-10-CM

## 2018-07-31 PROCEDURE — 99212 OFFICE O/P EST SF 10 MIN: CPT | Mod: S$GLB,,, | Performed by: OBSTETRICS & GYNECOLOGY

## 2018-07-31 PROCEDURE — 99999 PR PBB SHADOW E&M-EST. PATIENT-LVL III: CPT | Mod: PBBFAC,,, | Performed by: OBSTETRICS & GYNECOLOGY

## 2018-07-31 NOTE — PROGRESS NOTES
Subjective:      Chief Complaint:    Chief Complaint   Patient presents with    Consult       Menstrual History:    OB History      Para Term  AB Living    1 1 1     1    SAB TAB Ectopic Multiple Live Births            1          Menarche age: 15     Patient's last menstrual period was 2018.            Objective:        History of Present Illness AND  Examination detailed DICTATE:     HISTORY OF PRESENT ILLNESS:  The patient is 43 years of age.   1, para 1,   has been seen in the clinic previously.  The patient's last menstrual period   was 2018.  The patient was found to have large breast.  Dense breast   mammogram in 2017 indicated a dense breast.  The patient returned to   the clinic because of the breast being at the present very painful continuously   and aggravating.  In the past, I discussed with her possible reduction   mammoplasty, past history of endometriosis and laparoscopy.    PLAN:  We will repeat the mammogram to evaluate the breast and then we will   decide on followup and treatment, but most likely recommendation of breast   reduction.      MARTHA  dd: 2018 15:54:42 (CDT)  td: 2018 02:22:04 (CDT)  Doc ID   #[id number]  Job ID #[id number]    CC:             Assessment:      Diagnosis:DENSE   BREAST   PAINFULL   BREAST       Plan:      Return in 4  weeks

## 2018-08-07 ENCOUNTER — TELEPHONE (OUTPATIENT)
Dept: OBSTETRICS AND GYNECOLOGY | Facility: CLINIC | Age: 43
End: 2018-08-07

## 2018-08-07 DIAGNOSIS — N97.9 FEMALE FERTILITY PROBLEMS: ICD-10-CM

## 2018-08-07 DIAGNOSIS — N91.2 AMENORRHEA: ICD-10-CM

## 2018-08-07 RX ORDER — MEDROXYPROGESTERONE ACETATE 10 MG/1
10 TABLET ORAL DAILY
Qty: 10 TABLET | Refills: 0 | Status: CANCELLED | OUTPATIENT
Start: 2018-08-07 | End: 2019-08-07

## 2018-08-07 NOTE — TELEPHONE ENCOUNTER
----- Message from Alexus Blanco sent at 8/7/2018  1:35 PM CDT -----  Contact: Self/[phone]  Patient would like to speak to the staff about a Diagnostic Mammogram and Ultrasound. She stated that the cost would be $540 for the Mammogram and over $300 for the Ultrasound. Thank you.  -----------------------------------------------------------------------  8/7/18 @ 4943 ([facility])   CALL ATTEMPT TO PT UNSUCCESSFUL , UNABLE TO LEAVE A MESSAGE  FOR PT TO RETURN CALL TO OFFICE CONCERNING PRICE OF MMG, DUE TO NO VOICEMAIL SET UP, PT SHOULD SPEAK WITH BILLING OFFICE NOT NURSING

## 2018-08-10 ENCOUNTER — TELEPHONE (OUTPATIENT)
Dept: OBSTETRICS AND GYNECOLOGY | Facility: CLINIC | Age: 43
End: 2018-08-10

## 2018-08-10 NOTE — TELEPHONE ENCOUNTER
----- Message from Bridget Freeman sent at 8/10/2018  2:56 PM CDT -----  Contact: self  Pt asking for a call back regarding mammo. She states she was ordered an advanced screening and would like to discuss so insurance will cover. Please call back at [phone].   -----------------------------------------------------------------  8/10/18 @ 8883 (VICTOR M)   SPOKE WITH MS ANDINO, INFORMED HER THAT SHE WILL HAVE TO CALL THE BILLING DEPT AND THEY CAN EXPLAIN TO HER WHAT HER INSURANCE WILL COVER. INFORMED HER TO CALL EARLY Monday WHEN THEY OPEN

## 2019-03-06 ENCOUNTER — TELEPHONE (OUTPATIENT)
Dept: OBSTETRICS AND GYNECOLOGY | Facility: CLINIC | Age: 44
End: 2019-03-06

## 2019-03-12 ENCOUNTER — TELEPHONE (OUTPATIENT)
Dept: OBSTETRICS AND GYNECOLOGY | Facility: CLINIC | Age: 44
End: 2019-03-12

## 2019-03-12 DIAGNOSIS — R92.30 DENSE BREAST TISSUE ON MAMMOGRAM: Primary | ICD-10-CM

## 2019-03-12 NOTE — TELEPHONE ENCOUNTER
3/12/19 @ 1868   SPOKE WITH MS ANDINO, INFORMED HER THAT DR SOMERS PUT THE ORDERS IN FOR HER MMG AND U.S OF THE BREAST, AND SHE CAN GET IT DONE AT HER CONVENIENCE

## 2019-05-15 ENCOUNTER — OFFICE VISIT (OUTPATIENT)
Dept: OBSTETRICS AND GYNECOLOGY | Facility: CLINIC | Age: 44
End: 2019-05-15
Payer: COMMERCIAL

## 2019-05-15 VITALS
BODY MASS INDEX: 29.23 KG/M2 | WEIGHT: 165 LBS | DIASTOLIC BLOOD PRESSURE: 70 MMHG | HEIGHT: 63 IN | SYSTOLIC BLOOD PRESSURE: 124 MMHG

## 2019-05-15 DIAGNOSIS — Z01.419 ENCOUNTER FOR GYNECOLOGICAL EXAMINATION WITHOUT ABNORMAL FINDING: ICD-10-CM

## 2019-05-15 DIAGNOSIS — N97.9 FEMALE FERTILITY PROBLEM: ICD-10-CM

## 2019-05-15 DIAGNOSIS — N80.9 ENDOMETRIOSIS: ICD-10-CM

## 2019-05-15 DIAGNOSIS — Z00.00 ANNUAL PHYSICAL EXAM: Primary | ICD-10-CM

## 2019-05-15 PROCEDURE — 87491 CHLMYD TRACH DNA AMP PROBE: CPT

## 2019-05-15 PROCEDURE — 88175 CYTOPATH C/V AUTO FLUID REDO: CPT

## 2019-05-15 PROCEDURE — 99396 PREV VISIT EST AGE 40-64: CPT | Mod: S$GLB,,, | Performed by: OBSTETRICS & GYNECOLOGY

## 2019-05-15 PROCEDURE — 3008F BODY MASS INDEX DOCD: CPT | Mod: CPTII,S$GLB,, | Performed by: OBSTETRICS & GYNECOLOGY

## 2019-05-15 PROCEDURE — 3008F PR BODY MASS INDEX (BMI) DOCUMENTED: ICD-10-PCS | Mod: CPTII,S$GLB,, | Performed by: OBSTETRICS & GYNECOLOGY

## 2019-05-15 PROCEDURE — 99999 PR PBB SHADOW E&M-EST. PATIENT-LVL III: ICD-10-PCS | Mod: PBBFAC,,, | Performed by: OBSTETRICS & GYNECOLOGY

## 2019-05-15 PROCEDURE — 99999 PR PBB SHADOW E&M-EST. PATIENT-LVL III: CPT | Mod: PBBFAC,,, | Performed by: OBSTETRICS & GYNECOLOGY

## 2019-05-15 PROCEDURE — 99396 PR PREVENTIVE VISIT,EST,40-64: ICD-10-PCS | Mod: S$GLB,,, | Performed by: OBSTETRICS & GYNECOLOGY

## 2019-05-15 NOTE — PATIENT INSTRUCTIONS

## 2019-05-15 NOTE — PROGRESS NOTES
Subjective:      Chief Complaint:    Chief Complaint   Patient presents with    Gynecologic Exam       Menstrual History:    OB History        1    Para   1    Term   1            AB        Living   1       SAB        TAB        Ectopic        Multiple        Live Births   1                 Menarche age: 15     Patient's last menstrual period was 2019.                Objective:      PRESENT ILLNESS AND PHYSICAL EXAMINATION NOTE:  The patient is 44 years of age.     1, para 1, here for annual exam.  Pap smear in 2014, negative.    Mammogram for this year ordered.  Cycle regular, seven days bleeding, not much   pain or discomfort.  Past medical history, endometriosis, multiple   laparoscopies, neosalpingostomy and fertility problem.  The patient also had an   HSG, which indicated tubal patency.  She is still interested in pregnancy.  I   discussed with her the problem and recommendation IVF.    PHYSICAL EXAMINATION:  GENERAL:  Well-developed, well-nourished, alert, oriented.  VITAL SIGNS:  Blood pressure 124/70, weight is 165.  BREASTS:  No lumps, mass, discharge, skin changes, retraction, nipple changes.    Axilla negative.  Mammogram, bone density ordered.  The patient is considering   reduction mammoplasty because of pain.  PELVIC:  External normal.  Vulva normal.  Bartholin, urethral and [place]   negative.  Vagina, very mild faint discharge, no inflammation, no odor.  Cervix   is clear.  Pap smear was repeated.  Uterus is normal size, shape, position.    Both ovaries are palpable, some decrease in motility; however, no pain on exam.    Good pelvic support.  RECTAL:  Negative.    IMPRESSION:  Essentially normal pelvic exam.    PLAN:  Pap smear.  Continue with multivitamins, calcium, vitamin D, as stated   mammogram, bone density ordered.  Also, recommended IVF for fertility.      MARTHA  dd: 05/15/2019 09:24:01 (CDT)  td: 2019 02:11:20 (CDT)  Doc ID   #[id number]  Job ID #[id number]    CC:        History of Present Illness AND  Examination detailed DICTATE:        Physical Exam   Constitutional: She is oriented to person, place, and time. She appears well-developed and well-nourished. No distress.   HENT:   Head: Normocephalic  Eyes: Pupils are equal, round, and reactive to light.   Neck: Neck supple. No tracheal deviation present.   Cardiovascular: Normal rate, regular rhythm and normal heart sounds. No murmur heard.  Pulmonary/Chest: Effort normal and breath sounds normal. No respiratory distress. She has no wheezes. She has no rales. She exhibits no tenderness.   Abdominal: Bowel sounds are normal. She exhibits no distension and no mass. There is no tenderness. There is no rebound and no guarding..   Musculoskeletal: Normal range of motion.   Lymphadenopathy:        Right: No inguinal adenopathy present.        Left: No inguinal adenopathy present.   Neurological: She is alert and oriented.  Skin: Skin is warm. No rash noted.        Review of Systems  Review of Systems   Normal ROS:   Constitutional: Negative for fever, chills, activity change fatigue and unexpected weight change.   HENT: Negative for nosebleeds, congestion.  Eyes: Negative for visual disturbance.   Respiratory: Negative for shortness of breath and wheezing.    Cardiovascular: Negative for chest pain, palpitations.   Gastrointestinal: Negative for abdominal pain, diarrhea, constipation, blood in stool and abdominal distention.   Musculoskeletal: Negative for back pain.   Allergic/Immunologic: Negative for environmental allergies and food allergies.   Neurological: Negative for seizures, syncope, weakness, numbness and headaches.   Hematological: Negative for adenopathy. Does not bruise/bleed easily.   Psychiatric/Behavioral: Negative for hallucinations, behavioral problems, confusion.    Assessment:      Diagnosis: GYN   EXAM   ENDOMETRIOSIS  Plan:          Return in 12  months

## 2019-05-17 LAB
C TRACH DNA SPEC QL NAA+PROBE: NOT DETECTED
N GONORRHOEA DNA SPEC QL NAA+PROBE: NOT DETECTED

## 2019-06-21 ENCOUNTER — HOSPITAL ENCOUNTER (OUTPATIENT)
Dept: RADIOLOGY | Facility: HOSPITAL | Age: 44
Discharge: HOME | End: 2019-06-21
Attending: OBSTETRICS & GYNECOLOGY
Payer: COMMERCIAL

## 2019-06-21 DIAGNOSIS — R92.30 DENSE BREAST TISSUE ON MAMMOGRAM: ICD-10-CM

## 2019-06-21 DIAGNOSIS — Z12.31 ENCOUNTER FOR SCREENING MAMMOGRAM FOR BREAST CANCER: ICD-10-CM

## 2019-06-21 PROCEDURE — 77067 SCR MAMMO BI INCL CAD: CPT | Mod: TC

## 2019-06-21 PROCEDURE — 77063 MAMMO DIGITAL SCREENING BILAT WITH TOMOSYNTHESIS_CAD: ICD-10-PCS | Mod: 26,,, | Performed by: RADIOLOGY

## 2019-06-21 PROCEDURE — 77067 MAMMO DIGITAL SCREENING BILAT WITH TOMOSYNTHESIS_CAD: ICD-10-PCS | Mod: 26,,, | Performed by: RADIOLOGY

## 2019-06-21 PROCEDURE — 77063 BREAST TOMOSYNTHESIS BI: CPT | Mod: 26,,, | Performed by: RADIOLOGY

## 2019-06-21 PROCEDURE — 77067 SCR MAMMO BI INCL CAD: CPT | Mod: 26,,, | Performed by: RADIOLOGY

## 2020-08-11 ENCOUNTER — TELEPHONE (OUTPATIENT)
Dept: OBSTETRICS AND GYNECOLOGY | Facility: CLINIC | Age: 45
End: 2020-08-11

## 2020-08-11 NOTE — TELEPHONE ENCOUNTER
Spoke with pt appt scheduled       ----- Message from Sarah Brown sent at 8/11/2020  8:30 AM CDT -----  Regarding: Orders  Type: Patient Call Back    Who called: Patient    What is the request in detail: Patient is requesting mammogram orders. Please advise.    Can the clinic reply by [facility]? No    Would the patient rather a call back or a response via [facility]? Call    Best call back number: [phone] ([place])     Additional Information: n/a

## 2020-08-12 ENCOUNTER — OFFICE VISIT (OUTPATIENT)
Dept: OBSTETRICS AND GYNECOLOGY | Facility: CLINIC | Age: 45
End: 2020-08-12
Payer: COMMERCIAL

## 2020-08-12 VITALS
DIASTOLIC BLOOD PRESSURE: 84 MMHG | BODY MASS INDEX: 28.55 KG/M2 | WEIGHT: 161.19 LBS | SYSTOLIC BLOOD PRESSURE: 126 MMHG

## 2020-08-12 DIAGNOSIS — L98.9 LEG SKIN LESION, LEFT: ICD-10-CM

## 2020-08-12 DIAGNOSIS — Z01.419 ENCOUNTER FOR GYNECOLOGICAL EXAMINATION WITHOUT ABNORMAL FINDING: Primary | ICD-10-CM

## 2020-08-12 DIAGNOSIS — Z12.31 BREAST CANCER SCREENING BY MAMMOGRAM: ICD-10-CM

## 2020-08-12 PROCEDURE — 11106 INCAL BX SKN SINGLE LES: CPT | Mod: S$GLB,,, | Performed by: OBSTETRICS & GYNECOLOGY

## 2020-08-12 PROCEDURE — 99396 PREV VISIT EST AGE 40-64: CPT | Mod: 25,S$GLB,, | Performed by: OBSTETRICS & GYNECOLOGY

## 2020-08-12 PROCEDURE — 99396 PR PREVENTIVE VISIT,EST,40-64: ICD-10-PCS | Mod: 25,S$GLB,, | Performed by: OBSTETRICS & GYNECOLOGY

## 2020-08-12 PROCEDURE — 11106 PR INCISIONAL BIOPSY, SKIN, SINGLE LESION: ICD-10-PCS | Mod: S$GLB,,, | Performed by: OBSTETRICS & GYNECOLOGY

## 2020-08-12 PROCEDURE — 88305 TISSUE EXAM BY PATHOLOGIST: ICD-10-PCS | Mod: 26,,, | Performed by: PATHOLOGY

## 2020-08-12 PROCEDURE — 99999 PR PBB SHADOW E&M-EST. PATIENT-LVL II: ICD-10-PCS | Mod: PBBFAC,,, | Performed by: OBSTETRICS & GYNECOLOGY

## 2020-08-12 PROCEDURE — 3008F PR BODY MASS INDEX (BMI) DOCUMENTED: ICD-10-PCS | Mod: CPTII,S$GLB,, | Performed by: OBSTETRICS & GYNECOLOGY

## 2020-08-12 PROCEDURE — 88305 TISSUE EXAM BY PATHOLOGIST: CPT | Performed by: PATHOLOGY

## 2020-08-12 PROCEDURE — 99999 PR PBB SHADOW E&M-EST. PATIENT-LVL II: CPT | Mod: PBBFAC,,, | Performed by: OBSTETRICS & GYNECOLOGY

## 2020-08-12 PROCEDURE — 88305 TISSUE EXAM BY PATHOLOGIST: CPT | Mod: 26,,, | Performed by: PATHOLOGY

## 2020-08-12 PROCEDURE — 3008F BODY MASS INDEX DOCD: CPT | Mod: CPTII,S$GLB,, | Performed by: OBSTETRICS & GYNECOLOGY

## 2020-08-12 RX ORDER — ALBUTEROL SULFATE 90 UG/1
AEROSOL, METERED RESPIRATORY (INHALATION)
COMMUNITY
Start: 2020-08-05

## 2020-08-12 RX ORDER — BUPROPION HYDROCHLORIDE 150 MG/1
150 TABLET ORAL DAILY
COMMUNITY
Start: 2020-06-23

## 2020-08-12 NOTE — PROGRESS NOTES
Subjective:       Patient ID: Arpita Molina is a 45 y.o. female.    Chief Complaint:  Well Woman      History of Present Illness  HPI  Annual Exam-Premenopausal  Patient presents for annual exam. The patient has no complaints today. The patient is sexually active. GYN screening history: last pap: was normal and last mammogram: was normal. The patient wears seatbelts: yes. The patient participates in regular exercise: yes. Has the patient ever been transfused or tattooed?: yes. The patient reports that there is not domestic violence in her life.      She is currently attempting pregnancy.  Reports regular menses.    GYN & OB History  Patient's last menstrual period was 2020 (exact date).   Date of Last Pap: 2019    OB History    Para Term  AB Living   1 1 1     1   SAB TAB Ectopic Multiple Live Births           1      # Outcome Date GA Lbr Livan/2nd Weight Sex Delivery Anes PTL Lv   1 Term 13 40w0d  3.714 kg (8 lb 3 oz) M Vag-Spont EPI N HEATHER     Past Medical History:  Past Medical History:   Diagnosis Date    Anemia     not currently taking anything for it    Endometriosis        Past Surgical History:  Past Surgical History:   Procedure Laterality Date    laproscopy  ,     for endometriosis    VAGINAL DELIVERY         Family History:  Family History   Problem Relation Age of Onset    Hypertension Mother     Diabetes Mother     Breast cancer Maternal Aunt     Breast cancer Maternal Grandmother        Allergies:  Review of patient's allergies indicates:  No Known Allergies    Medications:  Current Outpatient Medications on File Prior to Visit   Medication Sig Dispense Refill    buPROPion (WELLBUTRIN XL) 150 MG TB24 tablet Take 150 mg by mouth once daily.      PROAIR HFA 90 mcg/actuation inhaler        No current facility-administered medications on file prior to visit.        Social History:  Social History     Tobacco Use    Smoking status: Never Smoker     Smokeless tobacco: Never Used   Substance Use Topics    Alcohol use: Yes     Comment: occasionally drinks wine    Drug use: No            Review of Systems  Review of Systems   Constitutional: Negative.    HENT: Negative.    Eyes: Negative.    Respiratory: Negative.    Cardiovascular: Negative.    Gastrointestinal: Negative.    Endocrine: Negative.    Genitourinary: Negative.    Musculoskeletal: Negative.    Integumentary:  Negative.   Neurological: Negative.    Hematological: Negative.    Psychiatric/Behavioral: Negative.    Breast: negative.            Objective:    Physical Exam:   Constitutional: She is oriented to person, place, and time. She appears well-nourished.    HENT:   Head: Normocephalic and atraumatic.    Eyes: EOM are normal. Right eye exhibits normal extraocular motion. Left eye exhibits normal extraocular motion.    Neck: Neck supple. No thyromegaly present.    Cardiovascular: Normal rate.     Pulmonary/Chest: Effort normal. No respiratory distress. Right breast exhibits no mass (bilateral scars from breast reduction), no skin change and no tenderness. Left breast exhibits no mass, no skin change and no tenderness. Breasts are symmetrical.        Abdominal: Soft. She exhibits no distension and no mass. There is no abdominal tenderness.     Genitourinary:    Vagina and uterus normal.            Pelvic exam was performed with patient supine.   There is no rash or lesion on the right labia. There is no rash or lesion on the left labia. Uterus is not tender. Cervix is normal. Right adnexum displays no tenderness and no fullness. Left adnexum displays no tenderness and no fullness. No bleeding in the vagina. Labial bartholins normal.Cervix exhibits no motion tenderness and no friability. negative for vaginal discharge          Musculoskeletal: Normal range of motion.      Lymphadenopathy:     She has no cervical adenopathy.    Neurological: She is oriented to person, place, and time.   Cranial Nerves  II-XII grossly intact.    Skin: No rash noted. No erythema.    Psychiatric: She has a normal mood and affect. Her behavior is normal.          Assessment:        1. Encounter for gynecological examination without abnormal finding    2. Breast cancer screening by mammogram    3. Leg skin lesion, right                Plan:      1. Encounter for gynecological examination without abnormal finding  - Pap due in 1 year.  -   Screening tests as ordered.  - Diet and exercise encouraged.    Counseling: Perimenopause/Menopause  Stress management techniques  indications for and frequency of periodic gynecologic exam  reviewed current Pap guidelines. Explained new understanding of natural history of cervical disease and improved Paps. Recommended guideline concordant care.       2. Breast cancer screening by mammogram  - Mammo Digital Screening Bilat w/ [facility]; Future    3. Leg skin lesion, left  - Removed today.  See procedure note  - Specimen to Pathology, Ob/Gyn

## 2020-08-12 NOTE — PROCEDURES
Procedures   45 y.o.  Patient's last menstrual period was 2020 (exact date). presents for skin biopsy on left thigh due to skin tag/lesion on left thigh.      Procedure reviewed with patient and consent signed.    Procedure:  Prior to performing the procedure, a time-out was performed.  The following components of the time out were conducted:  Verification of patient identity  Verification of the exact nature of procedure  Verification of surgical site and side  Review of allergies  Verification of the presence of a signed informed consent  Initiation of sponge and needle count, if indicated    1% Lidocaine used for local anesthesia after betadine prep.  Left thigh biopsy obtained with scissor from the left thigh.    Hemostasis achieved by with suture and 4-0 Vicryl suture    Patient tolerated the procedure well.  Complications: None  EBL: minimal  Discharge instructions reviewed.  Follow-up by telephone to review results in 1-2 weeks.

## 2020-08-18 ENCOUNTER — HOSPITAL ENCOUNTER (OUTPATIENT)
Dept: RADIOLOGY | Facility: HOSPITAL | Age: 45
Discharge: HOME | End: 2020-08-18
Attending: OBSTETRICS & GYNECOLOGY
Payer: COMMERCIAL

## 2020-08-18 VITALS — WEIGHT: 161.19 LBS | HEIGHT: 63 IN | BODY MASS INDEX: 28.56 KG/M2

## 2020-08-18 DIAGNOSIS — Z12.31 BREAST CANCER SCREENING BY MAMMOGRAM: ICD-10-CM

## 2020-08-18 LAB
FINAL PATHOLOGIC DIAGNOSIS: NORMAL
GROSS: NORMAL

## 2020-08-18 PROCEDURE — 77067 SCR MAMMO BI INCL CAD: CPT | Mod: TC

## 2020-08-18 PROCEDURE — 77063 BREAST TOMOSYNTHESIS BI: CPT | Mod: 26,,, | Performed by: RADIOLOGY

## 2020-08-18 PROCEDURE — 77067 SCR MAMMO BI INCL CAD: CPT | Mod: 26,,, | Performed by: RADIOLOGY

## 2020-08-18 PROCEDURE — 77067 MAMMO DIGITAL SCREENING BILAT WITH TOMOSYNTHESIS_CAD: ICD-10-PCS | Mod: 26,,, | Performed by: RADIOLOGY

## 2020-08-18 PROCEDURE — 77063 MAMMO DIGITAL SCREENING BILAT WITH TOMOSYNTHESIS_CAD: ICD-10-PCS | Mod: 26,,, | Performed by: RADIOLOGY

## 2021-01-22 ENCOUNTER — LAB VISIT (OUTPATIENT)
Dept: LAB | Facility: HOSPITAL | Age: 46
End: 2021-01-22
Attending: OBSTETRICS & GYNECOLOGY
Payer: COMMERCIAL

## 2021-01-22 ENCOUNTER — OFFICE VISIT (OUTPATIENT)
Dept: OBSTETRICS AND GYNECOLOGY | Facility: CLINIC | Age: 46
End: 2021-01-22
Payer: COMMERCIAL

## 2021-01-22 DIAGNOSIS — N92.6 IRREGULAR MENSES: ICD-10-CM

## 2021-01-22 DIAGNOSIS — Z11.3 SCREENING EXAMINATION FOR STD (SEXUALLY TRANSMITTED DISEASE): ICD-10-CM

## 2021-01-22 DIAGNOSIS — Z11.3 SCREENING EXAMINATION FOR STD (SEXUALLY TRANSMITTED DISEASE): Primary | ICD-10-CM

## 2021-01-22 DIAGNOSIS — Z30.45 INITIAL ENCOUNTER FOR MANAGEMENT OF CONTRACEPTIVE PATCH USE: ICD-10-CM

## 2021-01-22 LAB
BASOPHILS # BLD AUTO: 0.03 K/UL (ref 0–0.2)
BASOPHILS NFR BLD: 0.4 % (ref 0–1.9)
DIFFERENTIAL METHOD: NORMAL
EOSINOPHIL # BLD AUTO: 0.1 K/UL (ref 0–0.5)
EOSINOPHIL NFR BLD: 0.8 % (ref 0–8)
ERYTHROCYTE [DISTWIDTH] IN BLOOD BY AUTOMATED COUNT: 12.4 % (ref 11.5–14.5)
HCT VFR BLD AUTO: 37.7 % (ref 37–48.5)
HGB BLD-MCNC: 12.6 G/DL (ref 12–16)
IMM GRANULOCYTES # BLD AUTO: 0.02 K/UL (ref 0–0.04)
IMM GRANULOCYTES NFR BLD AUTO: 0.3 % (ref 0–0.5)
LYMPHOCYTES # BLD AUTO: 2 K/UL (ref 1–4.8)
LYMPHOCYTES NFR BLD: 26.7 % (ref 18–48)
MCH RBC QN AUTO: 29.6 PG (ref 27–31)
MCHC RBC AUTO-ENTMCNC: 33.4 G/DL (ref 32–36)
MCV RBC AUTO: 89 FL (ref 82–98)
MONOCYTES # BLD AUTO: 0.9 K/UL (ref 0.3–1)
MONOCYTES NFR BLD: 12.1 % (ref 4–15)
NEUTROPHILS # BLD AUTO: 4.4 K/UL (ref 1.8–7.7)
NEUTROPHILS NFR BLD: 59.7 % (ref 38–73)
NRBC BLD-RTO: 0 /100 WBC
PLATELET # BLD AUTO: 240 K/UL (ref 150–350)
PMV BLD AUTO: 11.3 FL (ref 9.2–12.9)
RBC # BLD AUTO: 4.26 M/UL (ref 4–5.4)
TSH SERPL DL<=0.005 MIU/L-ACNC: 0.99 UIU/ML (ref 0.4–4)
WBC # BLD AUTO: 7.41 K/UL (ref 3.9–12.7)

## 2021-01-22 PROCEDURE — 85025 COMPLETE CBC W/AUTO DIFF WBC: CPT

## 2021-01-22 PROCEDURE — 83036 HEMOGLOBIN GLYCOSYLATED A1C: CPT

## 2021-01-22 PROCEDURE — 86803 HEPATITIS C AB TEST: CPT

## 2021-01-22 PROCEDURE — 87660 TRICHOMONAS VAGIN DIR PROBE: CPT

## 2021-01-22 PROCEDURE — 83520 IMMUNOASSAY QUANT NOS NONAB: CPT

## 2021-01-22 PROCEDURE — 86703 HIV-1/HIV-2 1 RESULT ANTBDY: CPT

## 2021-01-22 PROCEDURE — 87510 GARDNER VAG DNA DIR PROBE: CPT

## 2021-01-22 PROCEDURE — 86592 SYPHILIS TEST NON-TREP QUAL: CPT

## 2021-01-22 PROCEDURE — 99213 PR OFFICE/OUTPT VISIT, EST, LEVL III, 20-29 MIN: ICD-10-PCS | Mod: S$GLB,,, | Performed by: OBSTETRICS & GYNECOLOGY

## 2021-01-22 PROCEDURE — 36415 COLL VENOUS BLD VENIPUNCTURE: CPT

## 2021-01-22 PROCEDURE — 84443 ASSAY THYROID STIM HORMONE: CPT

## 2021-01-22 PROCEDURE — 99213 OFFICE O/P EST LOW 20 MIN: CPT | Mod: S$GLB,,, | Performed by: OBSTETRICS & GYNECOLOGY

## 2021-01-22 RX ORDER — NORELGESTROMIN AND ETHINYL ESTRADIOL 35; 150 UG/MG; UG/MG
1 PATCH TRANSDERMAL
Qty: 4 PATCH | Refills: 11 | Status: SHIPPED | OUTPATIENT
Start: 2021-01-22 | End: 2021-08-24 | Stop reason: SDUPTHER

## 2021-01-23 ENCOUNTER — TELEPHONE (OUTPATIENT)
Dept: OBSTETRICS AND GYNECOLOGY | Facility: HOSPITAL | Age: 46
End: 2021-01-23

## 2021-01-23 DIAGNOSIS — B96.89 BACTERIAL VAGINOSIS: Primary | ICD-10-CM

## 2021-01-23 DIAGNOSIS — N76.0 BACTERIAL VAGINOSIS: Primary | ICD-10-CM

## 2021-01-23 LAB
CANDIDA RRNA VAG QL PROBE: NEGATIVE
ESTIMATED AVG GLUCOSE: 97 MG/DL (ref 68–131)
G VAGINALIS RRNA GENITAL QL PROBE: POSITIVE
HBA1C MFR BLD HPLC: 5 % (ref 4–5.6)
T VAGINALIS RRNA GENITAL QL PROBE: NEGATIVE

## 2021-01-23 RX ORDER — METRONIDAZOLE 500 MG/1
500 TABLET ORAL EVERY 12 HOURS
Qty: 14 TABLET | Refills: 0 | Status: SHIPPED | OUTPATIENT
Start: 2021-01-23 | End: 2021-01-30

## 2021-01-25 LAB
HCV AB SERPL QL IA: NEGATIVE
RPR SER QL: NORMAL

## 2021-01-26 LAB
HIV 1+2 AB+HIV1 P24 AG SERPL QL IA: NEGATIVE
MIS SERPL-MCNC: <0.03 NG/ML (ref 0.03–5.5)

## 2021-06-03 ENCOUNTER — PATIENT MESSAGE (OUTPATIENT)
Dept: OBSTETRICS AND GYNECOLOGY | Facility: CLINIC | Age: 46
End: 2021-06-03

## 2021-08-24 ENCOUNTER — OFFICE VISIT (OUTPATIENT)
Dept: OBSTETRICS AND GYNECOLOGY | Facility: CLINIC | Age: 46
End: 2021-08-24
Payer: COMMERCIAL

## 2021-08-24 ENCOUNTER — LAB VISIT (OUTPATIENT)
Dept: LAB | Facility: HOSPITAL | Age: 46
End: 2021-08-24
Attending: OBSTETRICS & GYNECOLOGY
Payer: COMMERCIAL

## 2021-08-24 VITALS
DIASTOLIC BLOOD PRESSURE: 78 MMHG | WEIGHT: 174.19 LBS | SYSTOLIC BLOOD PRESSURE: 126 MMHG | HEIGHT: 63 IN | BODY MASS INDEX: 30.86 KG/M2

## 2021-08-24 DIAGNOSIS — N92.6 IRREGULAR MENSES: ICD-10-CM

## 2021-08-24 DIAGNOSIS — Z12.31 BREAST CANCER SCREENING BY MAMMOGRAM: ICD-10-CM

## 2021-08-24 DIAGNOSIS — Z11.3 SCREENING EXAMINATION FOR STD (SEXUALLY TRANSMITTED DISEASE): ICD-10-CM

## 2021-08-24 DIAGNOSIS — Z01.419 ENCOUNTER FOR GYNECOLOGICAL EXAMINATION WITHOUT ABNORMAL FINDING: Primary | ICD-10-CM

## 2021-08-24 LAB
PROLACTIN SERPL IA-MCNC: 10.7 NG/ML (ref 5.2–26.5)
T4 FREE SERPL-MCNC: 0.87 NG/DL (ref 0.71–1.51)
TSH SERPL DL<=0.005 MIU/L-ACNC: 1.47 UIU/ML (ref 0.4–4)

## 2021-08-24 PROCEDURE — 3074F SYST BP LT 130 MM HG: CPT | Mod: CPTII,S$GLB,, | Performed by: OBSTETRICS & GYNECOLOGY

## 2021-08-24 PROCEDURE — 3044F PR MOST RECENT HEMOGLOBIN A1C LEVEL <7.0%: ICD-10-PCS | Mod: CPTII,S$GLB,, | Performed by: OBSTETRICS & GYNECOLOGY

## 2021-08-24 PROCEDURE — 3044F HG A1C LEVEL LT 7.0%: CPT | Mod: CPTII,S$GLB,, | Performed by: OBSTETRICS & GYNECOLOGY

## 2021-08-24 PROCEDURE — 3008F PR BODY MASS INDEX (BMI) DOCUMENTED: ICD-10-PCS | Mod: CPTII,S$GLB,, | Performed by: OBSTETRICS & GYNECOLOGY

## 2021-08-24 PROCEDURE — 99999 PR PBB SHADOW E&M-EST. PATIENT-LVL III: ICD-10-PCS | Mod: PBBFAC,,, | Performed by: OBSTETRICS & GYNECOLOGY

## 2021-08-24 PROCEDURE — 1126F AMNT PAIN NOTED NONE PRSNT: CPT | Mod: CPTII,S$GLB,, | Performed by: OBSTETRICS & GYNECOLOGY

## 2021-08-24 PROCEDURE — 99396 PREV VISIT EST AGE 40-64: CPT | Mod: S$GLB,,, | Performed by: OBSTETRICS & GYNECOLOGY

## 2021-08-24 PROCEDURE — 84443 ASSAY THYROID STIM HORMONE: CPT | Performed by: OBSTETRICS & GYNECOLOGY

## 2021-08-24 PROCEDURE — 1159F PR MEDICATION LIST DOCUMENTED IN MEDICAL RECORD: ICD-10-PCS | Mod: CPTII,S$GLB,, | Performed by: OBSTETRICS & GYNECOLOGY

## 2021-08-24 PROCEDURE — 84439 ASSAY OF FREE THYROXINE: CPT | Performed by: OBSTETRICS & GYNECOLOGY

## 2021-08-24 PROCEDURE — 3008F BODY MASS INDEX DOCD: CPT | Mod: CPTII,S$GLB,, | Performed by: OBSTETRICS & GYNECOLOGY

## 2021-08-24 PROCEDURE — 36415 COLL VENOUS BLD VENIPUNCTURE: CPT | Performed by: OBSTETRICS & GYNECOLOGY

## 2021-08-24 PROCEDURE — 3074F PR MOST RECENT SYSTOLIC BLOOD PRESSURE < 130 MM HG: ICD-10-PCS | Mod: CPTII,S$GLB,, | Performed by: OBSTETRICS & GYNECOLOGY

## 2021-08-24 PROCEDURE — 84146 ASSAY OF PROLACTIN: CPT | Performed by: OBSTETRICS & GYNECOLOGY

## 2021-08-24 PROCEDURE — 1159F MED LIST DOCD IN RCRD: CPT | Mod: CPTII,S$GLB,, | Performed by: OBSTETRICS & GYNECOLOGY

## 2021-08-24 PROCEDURE — 99999 PR PBB SHADOW E&M-EST. PATIENT-LVL III: CPT | Mod: PBBFAC,,, | Performed by: OBSTETRICS & GYNECOLOGY

## 2021-08-24 PROCEDURE — 3078F DIAST BP <80 MM HG: CPT | Mod: CPTII,S$GLB,, | Performed by: OBSTETRICS & GYNECOLOGY

## 2021-08-24 PROCEDURE — 99396 PR PREVENTIVE VISIT,EST,40-64: ICD-10-PCS | Mod: S$GLB,,, | Performed by: OBSTETRICS & GYNECOLOGY

## 2021-08-24 PROCEDURE — 3078F PR MOST RECENT DIASTOLIC BLOOD PRESSURE < 80 MM HG: ICD-10-PCS | Mod: CPTII,S$GLB,, | Performed by: OBSTETRICS & GYNECOLOGY

## 2021-08-24 PROCEDURE — 1126F PR PAIN SEVERITY QUANTIFIED, NO PAIN PRESENT: ICD-10-PCS | Mod: CPTII,S$GLB,, | Performed by: OBSTETRICS & GYNECOLOGY

## 2021-08-24 RX ORDER — NORELGESTROMIN AND ETHINYL ESTRADIOL 35; 150 UG/MG; UG/MG
1 PATCH TRANSDERMAL
Qty: 4 PATCH | Refills: 11 | Status: SHIPPED | OUTPATIENT
Start: 2021-08-24 | End: 2021-08-25

## 2021-08-25 ENCOUNTER — PATIENT MESSAGE (OUTPATIENT)
Dept: OBSTETRICS AND GYNECOLOGY | Facility: CLINIC | Age: 46
End: 2021-08-25

## 2021-08-25 DIAGNOSIS — N93.9 ABNORMAL UTERINE BLEEDING (AUB): Primary | ICD-10-CM

## 2021-08-25 RX ORDER — METFORMIN HYDROCHLORIDE 500 MG/1
500 TABLET ORAL 2 TIMES DAILY WITH MEALS
Qty: 60 TABLET | Refills: 0 | Status: SHIPPED | OUTPATIENT
Start: 2021-08-25 | End: 2021-11-09 | Stop reason: SDUPTHER

## 2021-08-25 RX ORDER — MEDROXYPROGESTERONE ACETATE 10 MG/1
10 TABLET ORAL DAILY
Qty: 10 TABLET | Refills: 0 | Status: SHIPPED | OUTPATIENT
Start: 2021-08-25 | End: 2021-11-09 | Stop reason: SDUPTHER

## 2021-08-25 RX ORDER — B-COMPLEX WITH VITAMIN C
1 TABLET ORAL DAILY
Qty: 30 TABLET | Refills: 0 | Status: SHIPPED | OUTPATIENT
Start: 2021-08-25 | End: 2021-11-09 | Stop reason: SDUPTHER

## 2021-09-10 ENCOUNTER — HOSPITAL ENCOUNTER (OUTPATIENT)
Dept: RADIOLOGY | Facility: HOSPITAL | Age: 46
Discharge: HOME | End: 2021-09-10
Attending: OBSTETRICS & GYNECOLOGY
Payer: COMMERCIAL

## 2021-09-10 DIAGNOSIS — Z12.31 BREAST CANCER SCREENING BY MAMMOGRAM: ICD-10-CM

## 2021-09-10 PROCEDURE — 77067 SCR MAMMO BI INCL CAD: CPT | Mod: 26,,, | Performed by: RADIOLOGY

## 2021-09-10 PROCEDURE — 77063 BREAST TOMOSYNTHESIS BI: CPT | Mod: 26,,, | Performed by: RADIOLOGY

## 2021-09-10 PROCEDURE — 77067 MAMMO DIGITAL SCREENING BILAT WITH TOMO: ICD-10-PCS | Mod: 26,,, | Performed by: RADIOLOGY

## 2021-09-10 PROCEDURE — 77063 MAMMO DIGITAL SCREENING BILAT WITH TOMO: ICD-10-PCS | Mod: 26,,, | Performed by: RADIOLOGY

## 2021-09-10 PROCEDURE — 77067 SCR MAMMO BI INCL CAD: CPT | Mod: TC

## 2021-11-09 DIAGNOSIS — N93.9 ABNORMAL UTERINE BLEEDING (AUB): ICD-10-CM

## 2021-11-09 RX ORDER — METFORMIN HYDROCHLORIDE 500 MG/1
500 TABLET ORAL 2 TIMES DAILY WITH MEALS
Qty: 60 TABLET | Refills: 0 | Status: SHIPPED | OUTPATIENT
Start: 2021-11-09 | End: 2022-11-09

## 2021-11-09 RX ORDER — B-COMPLEX WITH VITAMIN C
1 TABLET ORAL DAILY
Qty: 30 TABLET | Refills: 0 | Status: SHIPPED | OUTPATIENT
Start: 2021-11-09 | End: 2022-11-09

## 2021-11-09 RX ORDER — MEDROXYPROGESTERONE ACETATE 10 MG/1
10 TABLET ORAL DAILY
Qty: 10 TABLET | Refills: 0 | Status: SHIPPED | OUTPATIENT
Start: 2021-11-09 | End: 2022-11-09

## 2022-02-08 ENCOUNTER — TELEPHONE (OUTPATIENT)
Dept: ORTHOPEDICS | Facility: CLINIC | Age: 47
End: 2022-02-08
Payer: COMMERCIAL

## 2022-02-08 NOTE — TELEPHONE ENCOUNTER
Spoke with pt. Pt states she fell over the weekend and injured her ankle. She sought ED evaluation today where they diagnosed her with an ankle fracture and to follow up with Ortho. appt scheduled. All questions answered. Pt verbalized understanding.

## 2022-02-08 NOTE — TELEPHONE ENCOUNTER
----- Message from Kalyani Baeza sent at 2/8/2022  9:37 AM CST -----  ZACH ANDINO calling regarding Appointment Access  (message) for #left fracture ankle. Pt have was in the ED @ [facility] [facility].  call back [phone]

## 2022-02-14 ENCOUNTER — OFFICE VISIT (OUTPATIENT)
Dept: PODIATRY | Facility: CLINIC | Age: 47
End: 2022-02-14
Payer: COMMERCIAL

## 2022-02-14 VITALS — HEART RATE: 60 BPM | DIASTOLIC BLOOD PRESSURE: 71 MMHG | SYSTOLIC BLOOD PRESSURE: 108 MMHG

## 2022-02-14 DIAGNOSIS — S82.892A CLOSED LEFT ANKLE FRACTURE, INITIAL ENCOUNTER: ICD-10-CM

## 2022-02-14 DIAGNOSIS — S82.62XA CLOSED LOW LATERAL MALLEOLUS FRACTURE, LEFT, INITIAL ENCOUNTER: Primary | ICD-10-CM
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
## 2022-02-14 PROCEDURE — 3078F PR MOST RECENT DIASTOLIC BLOOD PRESSURE < 80 MM HG: ICD-10-PCS | Mod: CPTII,S$GLB,, | Performed by: PODIATRIST

## 2022-02-14 PROCEDURE — 27786 PR CLOSED RX DIST FIBULA FX: ICD-10-PCS | Mod: LT,S$GLB,, | Performed by: PODIATRIST

## 2022-02-14 PROCEDURE — 99999 PR PBB SHADOW E&M-EST. PATIENT-LVL III: CPT | Mod: PBBFAC,,, | Performed by: PODIATRIST

## 2022-02-14 PROCEDURE — 1159F PR MEDICATION LIST DOCUMENTED IN MEDICAL RECORD: ICD-10-PCS | Mod: CPTII,S$GLB,, | Performed by: PODIATRIST

## 2022-02-14 PROCEDURE — 3078F DIAST BP <80 MM HG: CPT | Mod: CPTII,S$GLB,, | Performed by: PODIATRIST

## 2022-02-14 PROCEDURE — 99999 PR PBB SHADOW E&M-EST. PATIENT-LVL III: ICD-10-PCS | Mod: PBBFAC,,, | Performed by: PODIATRIST

## 2022-02-14 PROCEDURE — 99202 PR OFFICE/OUTPT VISIT, NEW, LEVL II, 15-29 MIN: ICD-10-PCS | Mod: 57,S$GLB,, | Performed by: PODIATRIST

## 2022-02-14 PROCEDURE — 27786 TREATMENT OF ANKLE FRACTURE: CPT | Mod: LT,S$GLB,, | Performed by: PODIATRIST

## 2022-02-14 PROCEDURE — 3074F PR MOST RECENT SYSTOLIC BLOOD PRESSURE < 130 MM HG: ICD-10-PCS | Mod: CPTII,S$GLB,, | Performed by: PODIATRIST

## 2022-02-14 PROCEDURE — 1159F MED LIST DOCD IN RCRD: CPT | Mod: CPTII,S$GLB,, | Performed by: PODIATRIST

## 2022-02-14 PROCEDURE — 3074F SYST BP LT 130 MM HG: CPT | Mod: CPTII,S$GLB,, | Performed by: PODIATRIST

## 2022-02-14 PROCEDURE — 99202 OFFICE O/P NEW SF 15 MIN: CPT | Mod: 57,S$GLB,, | Performed by: PODIATRIST

## 2022-03-06 NOTE — PROGRESS NOTES
Subjective:      Patient ID: Arpita Molina is a 47 y.o. female.    Chief Complaint: Follow-up (L ankle pain)    Arpita is a 47 y.o. female who presents to the podiatry clinic  with complaint of  left foot pain. Onset of the symptoms was a week ago. Precipitating event: injured when she fell back over a tree stump on neutral ground last Sun.& had to walk 3 blocks.. Current symptoms include: pain at the medial and lateral aspect of the ankle. Aggravating factors: any weight bearing. Seen in ED 2/8/22 - 2 days after injury. Treatment to date: avoidance of offending activity, prescription NSAIDS which are somewhat effective and HC 10/325 q4h prn pain #17 & IB 600mg q6h prn. Patients rates pain  7-8/10 on pain scale.  Past Medical History:   Diagnosis Date    Anemia     not currently taking anything for it    Depression     Endometriosis      Patient Active Problem List   Diagnosis    Advanced maternal age in pregnancy    Nausea and vomiting in pregnancy    Routine gynecological examination    Female fertility problem    Encounter for fertility planning    Encounter for gynecological examination without abnormal finding    Screening for STD (sexually transmitted disease)    Amenorrhea    Menopausal state    DUB (dysfunctional uterine bleeding)    Menopausal and perimenopausal disorder    Bilateral mastodynia    Dense breast tissue on mammogram    Endometriosis    PCP: Negin Warren MD     Objective:      Review of Systems   Constitutional: Negative for malaise/fatigue.   Cardiovascular: Positive for leg swelling. Negative for claudication.   Skin: Negative for color change, dry skin and suspicious lesions.   Musculoskeletal: Positive for falls and myalgias. Negative for joint pain, joint swelling and muscle weakness.   Psychiatric/Behavioral: The patient is not nervous/anxious.      Physical Exam  Vitals reviewed.   Constitutional:       General: She is not in acute distress.      Appearance: Normal appearance. She is well-developed and overweight.   Cardiovascular:      Pulses: Normal pulses.           Dorsalis pedis pulses are 2+ on the left side.   Musculoskeletal:      Left foot: Decreased range of motion. No deformity.        Feet:    Feet:      Left foot:      Skin integrity: Skin integrity normal.      Comments: L:  ROM foot and ankle joints, non painful & without crepitation except for area of injury.  TTP ATFL, CFL. No TTP styloid process. Tenderness along the course of the peroneus brevis retromalleolarly but without deficit.  No ankle instability.   Tenderness & localized swelling to lateral STJ w/ guarding on attempted passive inversion d/t pain.  Skin:     General: Skin is warm and dry.      Capillary Refill: Capillary refill takes less than 2 seconds.      Findings: Bruising present. No erythema or lesion.   Neurological:      Mental Status: She is alert and oriented to person, place, and time.      Sensory: No sensory deficit.      Motor: No weakness.      Gait: Gait abnormal.   Psychiatric:         Mood and Affect: Mood and affect normal.         Behavior: Behavior normal. Behavior is cooperative.       X-Ray Ankle Complete Left  Narrative: EXAMINATION:  XR ANKLE COMPLETE 3 VIEW LEFT    CLINICAL HISTORY:  Pain in left ankle and joints of left foot    TECHNIQUE:  AP, lateral and oblique views of the left ankle were performed.    COMPARISON:  None    FINDINGS:  There is a minimally displaced fracture involving the left lateral malleolus.  The remainder of the bones appear intact.  There is no evidence for dislocation.  There is associated soft tissue swelling.  Impression: Minimally displaced fracture involving the left lateral malleolus with associated soft tissue swelling.    Electronically signed by: Beau Manzanares MD  Date:    02/08/2022  Time:    09:09  I independently reviewed the Xray images. Intraarticular avulsion-type fracture distal aspect lateral malleolus exteding  into lateral ankle mortise.    Assessment:      Encounter Diagnoses   Name Primary?    Closed low lateral malleolus fracture, left, initial encounter Yes    Fall on same level from tripping as cause of accidental injury     Closed left ankle fracture, initial encounter        Problem List Items Addressed This Visit    None     Visit Diagnoses     Closed low lateral malleolus fracture, left, initial encounter    -  Primary    Relevant Orders    X-Ray Ankle Complete Left    NON-PNEUMATIC WALKING BOOT FOR HOME USE    Fall on same level from tripping as cause of accidental injury        Closed left ankle fracture, initial encounter             Plan:       Arpita was seen today for follow-up.    Diagnoses and all orders for this visit:    Closed low lateral malleolus fracture, left, initial encounter  -     X-Ray Ankle Complete Left; Future  -     NON-PNEUMATIC WALKING BOOT FOR HOME USE    Fall on same level from tripping as cause of accidental injury    Closed left ankle fracture, initial encounter    I counseled the patient on her conditions, their implications and medical management.    Ambulate w/ immobilization @ all times.    F/U Xrays 4-6 weeks.

## 2022-03-15 ENCOUNTER — OFFICE VISIT (OUTPATIENT)
Dept: PODIATRY | Facility: CLINIC | Age: 47
End: 2022-03-15
Payer: COMMERCIAL

## 2022-03-15 VITALS
DIASTOLIC BLOOD PRESSURE: 80 MMHG | BODY MASS INDEX: 29.94 KG/M2 | HEART RATE: 54 BPM | SYSTOLIC BLOOD PRESSURE: 119 MMHG | WEIGHT: 169 LBS

## 2022-03-15 DIAGNOSIS — M25.472 PAIN AND SWELLING OF LEFT ANKLE: ICD-10-CM

## 2022-03-15 DIAGNOSIS — S82.65XD CLOSED NONDISPLACED FRACTURE OF LATERAL MALLEOLUS OF LEFT FIBULA WITH ROUTINE HEALING, SUBSEQUENT ENCOUNTER: ICD-10-CM

## 2022-03-15 DIAGNOSIS — M67.471 GANGLION CYST OF RIGHT FOOT: Primary | ICD-10-CM

## 2022-03-15 DIAGNOSIS — M25.572 PAIN AND SWELLING OF LEFT ANKLE: ICD-10-CM

## 2022-03-15 DIAGNOSIS — S82.62XA CLOSED LOW LATERAL MALLEOLUS FRACTURE, LEFT, INITIAL ENCOUNTER: ICD-10-CM

## 2022-03-15 PROCEDURE — 99499 UNLISTED E&M SERVICE: CPT | Mod: S$GLB,,, | Performed by: PODIATRIST

## 2022-03-15 PROCEDURE — 99999 PR PBB SHADOW E&M-EST. PATIENT-LVL III: ICD-10-PCS | Mod: PBBFAC,,, | Performed by: PODIATRIST

## 2022-03-15 PROCEDURE — 3074F PR MOST RECENT SYSTOLIC BLOOD PRESSURE < 130 MM HG: ICD-10-PCS | Mod: CPTII,S$GLB,, | Performed by: PODIATRIST

## 2022-03-15 PROCEDURE — 1159F MED LIST DOCD IN RCRD: CPT | Mod: CPTII,S$GLB,, | Performed by: PODIATRIST

## 2022-03-15 PROCEDURE — 3079F PR MOST RECENT DIASTOLIC BLOOD PRESSURE 80-89 MM HG: ICD-10-PCS | Mod: CPTII,S$GLB,, | Performed by: PODIATRIST

## 2022-03-15 PROCEDURE — 99213 PR OFFICE/OUTPT VISIT, EST, LEVL III, 20-29 MIN: ICD-10-PCS | Mod: 25,S$GLB,, | Performed by: PODIATRIST

## 2022-03-15 PROCEDURE — 1159F PR MEDICATION LIST DOCUMENTED IN MEDICAL RECORD: ICD-10-PCS | Mod: CPTII,S$GLB,, | Performed by: PODIATRIST

## 2022-03-15 PROCEDURE — 99499 NO LOS: ICD-10-PCS | Mod: S$GLB,,, | Performed by: PODIATRIST

## 2022-03-15 PROCEDURE — 99213 OFFICE O/P EST LOW 20 MIN: CPT | Mod: 25,S$GLB,, | Performed by: PODIATRIST

## 2022-03-15 PROCEDURE — 3008F BODY MASS INDEX DOCD: CPT | Mod: CPTII,S$GLB,, | Performed by: PODIATRIST

## 2022-03-15 PROCEDURE — 99999 PR PBB SHADOW E&M-EST. PATIENT-LVL III: CPT | Mod: PBBFAC,,, | Performed by: PODIATRIST

## 2022-03-15 PROCEDURE — 3074F SYST BP LT 130 MM HG: CPT | Mod: CPTII,S$GLB,, | Performed by: PODIATRIST

## 2022-03-15 PROCEDURE — 3079F DIAST BP 80-89 MM HG: CPT | Mod: CPTII,S$GLB,, | Performed by: PODIATRIST

## 2022-03-15 PROCEDURE — 3008F PR BODY MASS INDEX (BMI) DOCUMENTED: ICD-10-PCS | Mod: CPTII,S$GLB,, | Performed by: PODIATRIST

## 2022-03-15 RX ORDER — DICLOFENAC SODIUM 10 MG/G
2 GEL TOPICAL 4 TIMES DAILY
Qty: 350 G | Refills: 2 | Status: SHIPPED | OUTPATIENT
Start: 2022-03-15

## 2022-03-15 NOTE — PROGRESS NOTES
Subjective:      Patient ID: Arpita Molina is a 47 y.o. female.    Chief Complaint: Follow-up    Arpita is a 47 y.o. female who presents for follow-up and Xrays for left foot pain, injured when she fell back over a tree stump on neutral ground over a month ago and walked 3 blocks. She is accompanied by her . Current symptoms include: pain at the medial and lateral aspect of the ankle. Aggravating factors: any weight bearing. Patient rates pain 2/10 on pain scale but is using a rollator & has not put any weight on the foot, even though she has a BAW.  States too nervous to do so.  Also, started an area R lateral hindfoot - occasionally tender.  Past Medical History:   Diagnosis Date    Anemia     not currently taking anything for it    Depression     Endometriosis      Patient Active Problem List   Diagnosis    Advanced maternal age in pregnancy    Nausea and vomiting in pregnancy    Routine gynecological examination    Female fertility problem    Encounter for fertility planning    Encounter for gynecological examination without abnormal finding    Screening for STD (sexually transmitted disease)    Amenorrhea    Menopausal state    DUB (dysfunctional uterine bleeding)    Menopausal and perimenopausal disorder    Bilateral mastodynia    Dense breast tissue on mammogram    Endometriosis      PCP: Negin Pryor MD     Objective:      Physical Exam  Vitals reviewed.   Constitutional:       Appearance: She is well-developed and overweight.   Cardiovascular:      Pulses: Normal pulses.           Dorsalis pedis pulses are 2+ on the right side and 2+ on the left side.   Musculoskeletal:      Right foot: Normal range of motion. No deformity.      Left foot: Decreased range of motion. No deformity.        Feet:    Feet:      Right foot:      Skin integrity: Skin integrity normal.      Left foot:      Skin integrity: Skin integrity normal.      Comments: L:  ROM foot and ankle joints,  non painful & without crepitation.  TTP lateral ankle ligaments but no TTP styloid process. Tenderness along the course of the peroneus brevis retromalleolarly.  No ankle instability.   Residual localized swelling STJ.  Skin:     General: Skin is warm and dry.      Capillary Refill: Capillary refill takes less than 2 seconds.      Findings: Lesion present. No bruising or erythema.      Comments: Fluctuant small mass consistent with a cyst distal to the subtalar joint dorsal lateral right foot; no reproducible discomfort to direct palpation.   Neurological:      Mental Status: She is alert and oriented to person, place, and time.      Sensory: No sensory deficit.      Motor: No weakness.      Gait: Gait abnormal (Rollator nonweightbearing left).   Psychiatric:         Mood and Affect: Affect normal. Mood is anxious.         Behavior: Behavior normal. Behavior is cooperative.       X-Ray Ankle Complete Left  Narrative: EXAMINATION:  XR ANKLE COMPLETE 3 VIEW LEFT    CLINICAL HISTORY:  Displaced fracture of lateral malleolus of left fibula, initial encounter for closed fracture    TECHNIQUE:  AP, lateral and oblique views of the left ankle were performed.    COMPARISON:  Left ankle radiograph dated 02/08/2022    FINDINGS:  Redemonstration of transversely oriented minimally displaced fracture through the lateral malleolus.  Persistent lucency at the site of fracture.  Ankle mortise appears symmetric.  Talar dome is maintained.  Improving degree of soft tissue swelling about the ankle.  Impression: As above.    Electronically signed by: Aleksey Munoz  Date:    03/15/2022  Time:    09:09  I independently reviewed the Xray images. Intraarticular fracture distal lateral malleolus without change but swelling or reduced significantly, consistent with clinical appearance.    Assessment:      Encounter Diagnoses   Name Primary?    Ganglion cyst of right foot Yes    Closed low lateral malleolus fracture, left, initial  encounter     Closed nondisplaced fracture of lateral malleolus of left fibula with routine healing, subsequent encounter     Pain and swelling of left ankle        Problem List Items Addressed This Visit    None     Visit Diagnoses     Ganglion cyst of right foot    -  Primary    Closed low lateral malleolus fracture, left, initial encounter        Closed nondisplaced fracture of lateral malleolus of left fibula with routine healing, subsequent encounter        Relevant Orders    X-Ray Ankle Complete Left    Pain and swelling of left ankle        Relevant Medications    diclofenac sodium (VOLTAREN) 1 % Gel         Plan:       Arpita was seen today for follow-up.    Diagnoses and all orders for this visit:    Ganglion cyst of right foot    Closed low lateral malleolus fracture, left, initial encounter    Closed nondisplaced fracture of lateral malleolus of left fibula with routine healing, subsequent encounter  -     X-Ray Ankle Complete Left; Future    Pain and swelling of left ankle  -     diclofenac sodium (VOLTAREN) 1 % Gel; Apply 2 g topically 4 (four) times daily.    I counseled the patient on her conditions, their implications and medical management.    She is encouraged to start to ambulate w/Body Armor boot.  Dispensed additional Tubigrip for left foot and ankle and a gel strap.    In regard to cyst left foot:  Discussed possible etiology and treatment options including evacuation injection steroid versus excision, both only with symptomatology.  Discussed rate of recurrence.    F/U including Xrays in 4-6 weeks sooner.

## 2022-04-04 ENCOUNTER — TELEPHONE (OUTPATIENT)
Dept: PODIATRY | Facility: CLINIC | Age: 47
End: 2022-04-04
Payer: COMMERCIAL

## 2022-04-04 NOTE — TELEPHONE ENCOUNTER
She would have to get her FMLA papers from work. I fill out the form with specific restrictions including specific activity & duration - work will have to determine whether they can accommodate her.

## 2022-04-04 NOTE — TELEPHONE ENCOUNTER
----- Message from Luz Elena Hogan sent at 4/4/2022 12:43 PM CDT -----  Contact: Patient, [phone]  Calling because she needs to apply for FMLA. Please advise. Thanks.

## 2022-04-11 ENCOUNTER — TELEPHONE (OUTPATIENT)
Dept: PODIATRY | Facility: CLINIC | Age: 47
End: 2022-04-11
Payer: COMMERCIAL

## 2022-05-18 ENCOUNTER — OFFICE VISIT (OUTPATIENT)
Dept: PODIATRY | Facility: CLINIC | Age: 47
End: 2022-05-18
Payer: COMMERCIAL

## 2022-05-18 VITALS
HEART RATE: 63 BPM | DIASTOLIC BLOOD PRESSURE: 71 MMHG | BODY MASS INDEX: 29.94 KG/M2 | SYSTOLIC BLOOD PRESSURE: 112 MMHG | WEIGHT: 169 LBS

## 2022-05-18 DIAGNOSIS — S82.65XD CLOSED NONDISPLACED FRACTURE OF LATERAL MALLEOLUS OF LEFT FIBULA WITH ROUTINE HEALING, SUBSEQUENT ENCOUNTER: Primary | ICD-10-CM

## 2022-05-18 DIAGNOSIS — S82.62XG: ICD-10-CM
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
## 2022-05-18 DIAGNOSIS — M76.72 PERONEUS BREVIS TENDINITIS, LEFT: ICD-10-CM

## 2022-05-18 PROCEDURE — 1159F PR MEDICATION LIST DOCUMENTED IN MEDICAL RECORD: ICD-10-PCS | Mod: CPTII,S$GLB,, | Performed by: PODIATRIST

## 2022-05-18 PROCEDURE — 3074F SYST BP LT 130 MM HG: CPT | Mod: CPTII,S$GLB,, | Performed by: PODIATRIST

## 2022-05-18 PROCEDURE — 99213 PR OFFICE/OUTPT VISIT, EST, LEVL III, 20-29 MIN: ICD-10-PCS | Mod: S$GLB,,, | Performed by: PODIATRIST

## 2022-05-18 PROCEDURE — 99213 OFFICE O/P EST LOW 20 MIN: CPT | Mod: S$GLB,,, | Performed by: PODIATRIST

## 2022-05-18 PROCEDURE — 3008F PR BODY MASS INDEX (BMI) DOCUMENTED: ICD-10-PCS | Mod: CPTII,S$GLB,, | Performed by: PODIATRIST

## 2022-05-18 PROCEDURE — 99999 PR PBB SHADOW E&M-EST. PATIENT-LVL III: ICD-10-PCS | Mod: PBBFAC,,, | Performed by: PODIATRIST

## 2022-05-18 PROCEDURE — 3008F BODY MASS INDEX DOCD: CPT | Mod: CPTII,S$GLB,, | Performed by: PODIATRIST

## 2022-05-18 PROCEDURE — 3074F PR MOST RECENT SYSTOLIC BLOOD PRESSURE < 130 MM HG: ICD-10-PCS | Mod: CPTII,S$GLB,, | Performed by: PODIATRIST

## 2022-05-18 PROCEDURE — 3078F DIAST BP <80 MM HG: CPT | Mod: CPTII,S$GLB,, | Performed by: PODIATRIST

## 2022-05-18 PROCEDURE — 1159F MED LIST DOCD IN RCRD: CPT | Mod: CPTII,S$GLB,, | Performed by: PODIATRIST

## 2022-05-18 PROCEDURE — 3078F PR MOST RECENT DIASTOLIC BLOOD PRESSURE < 80 MM HG: ICD-10-PCS | Mod: CPTII,S$GLB,, | Performed by: PODIATRIST

## 2022-05-18 PROCEDURE — 99999 PR PBB SHADOW E&M-EST. PATIENT-LVL III: CPT | Mod: PBBFAC,,, | Performed by: PODIATRIST

## 2022-05-18 NOTE — PROGRESS NOTES
Subjective:      Patient ID: Arpita Molina is a 47 y.o. female.    Chief Complaint: Follow-up (L foot )    Arpita is a 47 y.o. female who presents for follow-up and Xrays for left foot pain, injured when she fell back over a tree stump on neutral ground over 3 months ago and walked 3 blocks. DOI 2/6/22. Symptoms include: pain at the medial and lateral aspect of the ankle. Was using a rollator & had not put any weight on the foot in BAW @ last visit here over 2 months ago. Today, pain level is 3/10 but she is able to WB & would like to return to tennis shoes.  No c/o R ganglion cyst lateral mid/rearfoot.    Past Medical History:   Diagnosis Date    Anemia     not currently taking anything for it    Depression     Endometriosis      Patient Active Problem List   Diagnosis    Advanced maternal age in pregnancy    Nausea and vomiting in pregnancy    Routine gynecological examination    Female fertility problem    Encounter for fertility planning    Encounter for gynecological examination without abnormal finding    Screening for STD (sexually transmitted disease)    Amenorrhea    Menopausal state    DUB (dysfunctional uterine bleeding)    Menopausal and perimenopausal disorder    Bilateral mastodynia    Dense breast tissue on mammogram    Endometriosis      PCP: Negin Pryor MD     Objective:      Physical Exam  Vitals reviewed.   Constitutional:       Appearance: She is well-developed and overweight.   Cardiovascular:      Pulses: Normal pulses.           Dorsalis pedis pulses are 2+ on the left side.   Musculoskeletal:      Left foot: Normal range of motion. No deformity.        Feet:    Feet:      Left foot:      Skin integrity: Skin integrity normal.      Comments: L:  ROM foot and ankle joints, non painful & without crepitation.  Residual TTP along course of the PB retromalleolarly; no POP ATFL, CFL.  No ankle instability.   Residual localized swelling STJ.  Skin:     General: Skin  is warm and dry.      Capillary Refill: Capillary refill takes less than 2 seconds.      Findings: No bruising or erythema.      Comments: Cyst distal to STJ lateral R foot   Neurological:      Mental Status: She is alert and oriented to person, place, and time.      Sensory: No sensory deficit.      Motor: No weakness.      Gait: Gait abnormal (left foot injury).   Psychiatric:         Mood and Affect: Affect normal. Mood is not anxious.         Behavior: Behavior normal. Behavior is cooperative.       X-Ray Ankle Complete Left  Narrative: EXAMINATION:  XR ANKLE COMPLETE 3 VIEW LEFT    CLINICAL HISTORY:  Nondisplaced fracture of lateral malleolus of left fibula, subsequent encounter for closed fracture with routine healing    TECHNIQUE:  AP, lateral and oblique views of the left ankle were performed.    COMPARISON:  03/15/2022    FINDINGS:  Redemonstration of a transverse fracture involving the lateral malleolus.  Stable alignment.  Increased sclerosis however fracture line remains visible with incomplete bony bridging.  No evidence of new acute fracture.  Ankle mortise is symmetric.  Talar dome appears intact.  Improved lateral ankle soft tissue swelling.  Impression: As above.    Electronically signed by: Aleksey Ackerman  Date:    05/18/2022  Time:    12:49  I independently reviewed the Xray images. Intraarticular fracture w/ evidence of continued but incomplete healing distal lateral malleolus w/o displacement    Assessment:      Encounter Diagnoses   Name Primary?    Closed nondisplaced fracture of lateral malleolus of left fibula with routine healing, subsequent encounter Yes    Fall on same level from tripping as cause of accidental injury     Peroneus brevis tendinitis, left     Closed fracture of distal lateral malleolus of left fibula with delayed healing        Problem List Items Addressed This Visit    None     Visit Diagnoses     Closed nondisplaced fracture of lateral malleolus of left fibula with  routine healing, subsequent encounter    -  Primary    Relevant Orders    IMMOBILIZER FOR HOME USE    Fall on same level from tripping as cause of accidental injury        Peroneus brevis tendinitis, left        Relevant Orders    IMMOBILIZER FOR HOME USE    Closed fracture of distal lateral malleolus of left fibula with delayed healing        Relevant Orders    X-Ray Ankle Complete Left         Plan:       Arpita was seen today for follow-up.    Diagnoses and all orders for this visit:    Closed nondisplaced fracture of lateral malleolus of left fibula with routine healing, subsequent encounter  -     IMMOBILIZER FOR HOME USE    Fall on same level from tripping as cause of accidental injury    Peroneus brevis tendinitis, left  -     IMMOBILIZER FOR HOME USE    Closed fracture of distal lateral malleolus of left fibula with delayed healing  -     X-Ray Ankle Complete Left; Future    I counseled the patient on her conditions, their implications and medical management.    Dispensed arch pad & ASO ankle brace to be worn @ all time WB until fracture healed.    May return to tennis shoes w/ brace.    Continue Voltaren gel up to qid prn.    F/U including Xrays in 6 weeks, sooner prn.

## 2022-09-18 DIAGNOSIS — N93.9 ABNORMAL UTERINE BLEEDING (AUB): ICD-10-CM

## 2022-09-20 RX ORDER — MEDROXYPROGESTERONE ACETATE 10 MG/1
10 TABLET ORAL DAILY
Qty: 10 TABLET | Refills: 0 | OUTPATIENT
Start: 2022-09-20 | End: 2023-09-20

## 2022-09-20 RX ORDER — B-COMPLEX WITH VITAMIN C
1 TABLET ORAL DAILY
Qty: 30 TABLET | Refills: 0 | OUTPATIENT
Start: 2022-09-20 | End: 2023-09-20

## 2022-09-20 RX ORDER — METFORMIN HYDROCHLORIDE 500 MG/1
500 TABLET ORAL 2 TIMES DAILY WITH MEALS
Qty: 60 TABLET | Refills: 0 | OUTPATIENT
Start: 2022-09-20 | End: 2023-09-20

## 2025-05-09 ENCOUNTER — TELEPHONE (OUTPATIENT)
Dept: ENDOSCOPY | Facility: HOSPITAL | Age: 50
End: 2025-05-09

## 2025-05-09 VITALS — HEIGHT: 63 IN | BODY MASS INDEX: 25.69 KG/M2 | WEIGHT: 145 LBS

## 2025-05-09 DIAGNOSIS — Z12.11 SCREEN FOR COLON CANCER: Primary | ICD-10-CM

## 2025-05-09 RX ORDER — SODIUM, POTASSIUM,MAG SULFATES 17.5-3.13G
1 SOLUTION, RECONSTITUTED, ORAL ORAL DAILY
Qty: 1 KIT | Refills: 0 | Status: SHIPPED | OUTPATIENT
Start: 2025-05-09 | End: 2025-05-11

## 2025-05-09 NOTE — TELEPHONE ENCOUNTER
Patient is scheduled for a Colonoscopy on 6/23/25 with Dr. LOIS Graham  Referral for procedure from Direct access Pt

## 2025-05-21 ENCOUNTER — OFFICE VISIT (OUTPATIENT)
Dept: OBSTETRICS AND GYNECOLOGY | Facility: CLINIC | Age: 50
End: 2025-05-21
Payer: COMMERCIAL

## 2025-05-21 VITALS
BODY MASS INDEX: 26.54 KG/M2 | SYSTOLIC BLOOD PRESSURE: 120 MMHG | HEIGHT: 63 IN | HEART RATE: 55 BPM | DIASTOLIC BLOOD PRESSURE: 75 MMHG | WEIGHT: 149.81 LBS

## 2025-05-21 DIAGNOSIS — Z12.4 PAP SMEAR FOR CERVICAL CANCER SCREENING: ICD-10-CM

## 2025-05-21 DIAGNOSIS — Z12.31 SCREENING MAMMOGRAM, ENCOUNTER FOR: ICD-10-CM

## 2025-05-21 DIAGNOSIS — Z01.419 ENCOUNTER FOR GYNECOLOGICAL EXAMINATION (GENERAL) (ROUTINE) WITHOUT ABNORMAL FINDINGS: Primary | ICD-10-CM

## 2025-05-21 DIAGNOSIS — N93.0 PCB (POST COITAL BLEEDING): ICD-10-CM

## 2025-05-21 DIAGNOSIS — N95.1 MENOPAUSAL SYMPTOMS: ICD-10-CM

## 2025-05-21 PROCEDURE — 99999 PR PBB SHADOW E&M-EST. PATIENT-LVL III: CPT | Mod: PBBFAC,,,

## 2025-05-21 NOTE — PROGRESS NOTES
"CC: Est GYN Care/ WWE    Arpita Molina is a 50 y.o. female  who presents as a new patient to establish GYN care and WWE.  Pt is postmenopausal since 2024. Having menopausal symptoms, needs scheduling for HRT assessment then consult.   She is sexually active, has noted postcoital bleeding, denies painful intercourse.   Denies breast or vaginal concerns today.   Denies further issues, problems, or complaints.    Pt feels safe at home and denies domestic violence.       /75   Pulse (!) 55   Ht 5' 3" (1.6 m)   Wt 67.9 kg (149 lb 12.8 oz)   BMI 26.54 kg/m²     OBGYN History  Menarche age 16  Menopause age 49   Obstetric History: , vaginal, denies complications.  GYN History: endometriosis- lap x2; breast reduction 2019    Pap: 05/15/2019 NILM; and Neg HPV  Mammogram: 09/10/2021: BIRADS I- Negative  Colonoscopy: scheduled  PCP: Negin Pryor MD   Routine Screening Labs:     Family History   Problem Relation Name Age of Onset    Breast cancer Maternal Grandmother      Hypertension Mother      Diabetes Mother      Breast cancer Maternal Aunt Selene     Colon cancer Neg Hx      Ovarian cancer Neg Hx      Uterine cancer Neg Hx      Cervical cancer Neg Hx       Past Medical History:   Diagnosis Date    Anemia     not currently taking anything for it    Depression     Endometriosis      Past Surgical History:   Procedure Laterality Date    laproscopy  ,     for endometriosis    TOTAL REDUCTION MAMMOPLASTY  2019    VAGINAL DELIVERY       Social History[1]    Current Medications[2]    ROS:  Constitutional: no weight loss, weight gain, fever, fatigue  Eyes:  No vision changes, glasses/contacts  ENT/Mouth: No ulcers, sinus problems, ears ringing, headache  Cardiovascular: No inability to lie flat, chest pain, exercise intolerance, swelling, heart palpitations  Respiratory: No wheezing, coughing blood, shortness of breath, or cough  Gastrointestinal: No diarrhea, bloody " stool, nausea/vomiting, constipation, gas, hemorrhoids  Genitourinary: No blood in urine, painful urination, urgency of urination, frequency of urination, incomplete emptying, incontinence, abnormal bleeding, painful periods, heavy periods, vaginal discharge, vaginal odor, painful intercourse, sexual problems, bleeding after intercourse.  Musculoskeletal: No muscle weakness  Skin/Breast: No painful breasts, nipple discharge, masses, rash, ulcers  Neurological: No passing out, seizures, numbness, headache  Endocrine: No diabetes, hypothyroid, hyperthyroid, hot flashes, hair loss, abnormal hair growth, acne  Psychiatric: No depression, crying  Hematologic: No bruises, bleeding, swollen lymph nodes, anemia.    PHYSICAL EXAM:   Physical Exam:   Constitutional: She is oriented to person, place, and time. Vital signs are normal. She appears well-developed and well-nourished. No distress.      Neck: No tracheal deviation present. No thyromegaly present.    Cardiovascular:       Exam reveals no edema.        Pulmonary/Chest: Effort normal. She exhibits no mass, no tenderness, no deformity and no retraction. Right breast exhibits no inverted nipple, no mass, no nipple discharge, no skin change, no tenderness, presence, no bleeding and no swelling. Left breast exhibits no inverted nipple, no mass, no nipple discharge, no skin change, no tenderness, presence, no bleeding and no swelling. Breasts are symmetrical.        Abdominal: Soft. She exhibits no distension and no mass. There is no abdominal tenderness. There is no rebound and no guarding. No hernia. Hernia confirmed negative in the right inguinal area and confirmed negative in the left inguinal area.     Genitourinary:    Inguinal canal, urethra, vagina, uterus, right adnexa and left adnexa normal.   Rectum:      No external hemorrhoid.      Pelvic exam was performed with patient in the lithotomy position.   The external female genitalia was normal.   No external  genitalia lesions identified,Genitalia hair distrobution normal .     Labial bartholins normal.There is no rash, tenderness or lesion on the right labia. There is no rash, tenderness or lesion on the left labia. Cervix is normal. No no adexnal prolapse. Right adnexum displays no mass, no tenderness and no fullness. Left adnexum displays no mass, no tenderness and no fullness. Vagina exhibits no lesion. No erythema, vaginal discharge, tenderness, bleeding, rectocele, cystocele or prolapse of vaginal walls in the vagina.    No foreign body in the vagina.      No signs of injury in the vagina.   Vagina was moist.      Cervix exhibits polyp. Cervix exhibits no motion tenderness, no lesion, no discharge, no friability and no tenderness.    pap smear completedUterus consistancy normal and Uerus contour normal  Uterus is not deviated, not enlarged, not tender and not hosting fibroids. Normal urethral meatus.   Genitourinary Comments: Small friable polyp noted at cervical os. Unable to visualize base.              Musculoskeletal: Normal range of motion and moves all extremeties. No edema.       Neurological: She is alert and oriented to person, place, and time.    Skin: Skin is warm and dry. No rash noted. She is not diaphoretic. No erythema. No pallor.    Psychiatric: She has a normal mood and affect. Her behavior is normal. Mood, judgment and thought content normal.        ASSESSMENT:   Encounter for gynecological examination (general) (routine) without abnormal findings    Pap smear for cervical cancer screening  -     Liquid-Based Pap Smear, Screening    Menopausal symptoms    Screening mammogram, encounter for    PCB (post coital bleeding)  -     US Pelvis Comp with Transvag NON-OB (xpd; Future; Expected date: 05/21/2025    PLAN:   - Pap + HPV today, CBE WNL  - MMG ordered  - TVUS for polyp/endometrium assessment  - Schedule Assessment then HRT consult  - Reinforced healthy lifestyle choices to include sleep hygiene,  regular exercise and nutrition.   - Follow up 1 year or PRN    Patient was counseled today on A.C.S. Pap guidelines and recommendations for yearly pelvic exams, mammograms and monthly self breast exams; to see her PCP for other health maintenance.     Female chaperone present for entire procedure                 [1]   Social History  Socioeconomic History    Marital status:    Tobacco Use    Smoking status: Never    Smokeless tobacco: Never   Substance and Sexual Activity    Alcohol use: Yes     Comment: occasionally drinks wine    Drug use: No    Sexual activity: Yes     Partners: Male   Social History Narrative    He is in Purchasing/Procurement for [employer]    She is working in [employer]     x 6 years, since 2011   [2]   Current Outpatient Medications:     medroxyPROGESTERone (PROVERA) 10 MG tablet, Take 1 tablet (10 mg total) by mouth once daily., Disp: 10 tablet, Rfl: 0    metFORMIN (GLUCOPHAGE) 500 MG tablet, Take 1 tablet (500 mg total) by mouth 2 (two) times daily with meals., Disp: 60 tablet, Rfl: 0

## 2025-05-28 ENCOUNTER — RESULTS FOLLOW-UP (OUTPATIENT)
Dept: OBSTETRICS AND GYNECOLOGY | Facility: CLINIC | Age: 50
End: 2025-05-28

## 2025-06-02 ENCOUNTER — HOSPITAL ENCOUNTER (OUTPATIENT)
Dept: RADIOLOGY | Facility: HOSPITAL | Age: 50
Discharge: HOME | End: 2025-06-02
Attending: INTERNAL MEDICINE
Payer: COMMERCIAL

## 2025-06-02 DIAGNOSIS — Z12.31 ENCOUNTER FOR SCREENING MAMMOGRAM FOR BREAST CANCER: ICD-10-CM

## 2025-06-02 PROCEDURE — 77067 SCR MAMMO BI INCL CAD: CPT | Mod: 26,,, | Performed by: RADIOLOGY

## 2025-06-02 PROCEDURE — 77067 SCR MAMMO BI INCL CAD: CPT | Mod: TC

## 2025-06-02 PROCEDURE — 77063 BREAST TOMOSYNTHESIS BI: CPT | Mod: 26,,, | Performed by: RADIOLOGY

## 2025-06-03 ENCOUNTER — CLINICAL SUPPORT (OUTPATIENT)
Dept: OBSTETRICS AND GYNECOLOGY | Facility: CLINIC | Age: 50
End: 2025-06-03
Payer: COMMERCIAL

## 2025-06-03 DIAGNOSIS — N95.1 MENOPAUSAL SYMPTOMS: Primary | ICD-10-CM

## 2025-06-05 ENCOUNTER — TELEPHONE (OUTPATIENT)
Dept: OBSTETRICS AND GYNECOLOGY | Facility: CLINIC | Age: 50
End: 2025-06-05
Payer: COMMERCIAL

## 2025-06-13 ENCOUNTER — PROCEDURE VISIT (OUTPATIENT)
Dept: OBSTETRICS AND GYNECOLOGY | Facility: CLINIC | Age: 50
End: 2025-06-13
Payer: COMMERCIAL

## 2025-06-13 VITALS
WEIGHT: 152.38 LBS | DIASTOLIC BLOOD PRESSURE: 84 MMHG | BODY MASS INDEX: 27 KG/M2 | HEART RATE: 55 BPM | SYSTOLIC BLOOD PRESSURE: 134 MMHG

## 2025-06-13 DIAGNOSIS — N93.0 PCB (POST COITAL BLEEDING): ICD-10-CM

## 2025-06-13 DIAGNOSIS — N84.1 CERVICAL POLYP: Primary | ICD-10-CM

## 2025-06-13 NOTE — PROCEDURES
Biopsy (Gynecological)    Date/Time: 6/13/2025 10:30 AM    Performed by: Sheryl Ward MD  Authorized by: Sheryl Ward MD    Consent obtained:  Prior to procedure the appropriate consent was completed and verified   Patient was prepped and draped in the normal sterile fashion.  Local anesthesia used?: No      Biopsy Location:  Cervix  Cervix:     Site:  6 00  Estimated blood loss (cc):  1     Cervical polyp 1mm at 6 o'clock position was removed.  Speculum was placed.  Polyp was grasped with Jose clamps.  Polyp was removed without complications.  Patient tolerated procedure well.  Specimen sent to pathology. Will follow up with results.

## 2025-06-17 ENCOUNTER — RESULTS FOLLOW-UP (OUTPATIENT)
Facility: CLINIC | Age: 50
End: 2025-06-17

## 2025-06-23 ENCOUNTER — ANESTHESIA (OUTPATIENT)
Dept: ENDOSCOPY | Facility: HOSPITAL | Age: 50
End: 2025-06-23
Payer: COMMERCIAL

## 2025-06-23 ENCOUNTER — HOSPITAL ENCOUNTER (OUTPATIENT)
Facility: HOSPITAL | Age: 50
Discharge: HOME | End: 2025-06-23
Attending: STUDENT IN AN ORGANIZED HEALTH CARE EDUCATION/TRAINING PROGRAM | Admitting: STUDENT IN AN ORGANIZED HEALTH CARE EDUCATION/TRAINING PROGRAM
Payer: COMMERCIAL

## 2025-06-23 ENCOUNTER — ANESTHESIA EVENT (OUTPATIENT)
Dept: ENDOSCOPY | Facility: HOSPITAL | Age: 50
End: 2025-06-23
Payer: COMMERCIAL

## 2025-06-23 VITALS
HEIGHT: 63 IN | RESPIRATION RATE: 18 BRPM | BODY MASS INDEX: 26.6 KG/M2 | SYSTOLIC BLOOD PRESSURE: 118 MMHG | TEMPERATURE: 98 F | OXYGEN SATURATION: 99 % | WEIGHT: 150.13 LBS | DIASTOLIC BLOOD PRESSURE: 57 MMHG | HEART RATE: 65 BPM

## 2025-06-23 DIAGNOSIS — Z12.11 SCREEN FOR COLON CANCER: ICD-10-CM

## 2025-06-23 LAB
B-HCG UR QL: NEGATIVE
CTP QC/QA: YES

## 2025-06-23 PROCEDURE — 45380 COLONOSCOPY AND BIOPSY: CPT | Mod: 33,XS | Performed by: STUDENT IN AN ORGANIZED HEALTH CARE EDUCATION/TRAINING PROGRAM

## 2025-06-23 PROCEDURE — 88305 TISSUE EXAM BY PATHOLOGIST: CPT | Mod: 26,,, | Performed by: PATHOLOGY

## 2025-06-23 PROCEDURE — 81025 URINE PREGNANCY TEST: CPT | Performed by: STUDENT IN AN ORGANIZED HEALTH CARE EDUCATION/TRAINING PROGRAM

## 2025-06-23 PROCEDURE — 27201089 HC SNARE, DISP (ANY): Performed by: STUDENT IN AN ORGANIZED HEALTH CARE EDUCATION/TRAINING PROGRAM

## 2025-06-23 PROCEDURE — 45385 COLONOSCOPY W/LESION REMOVAL: CPT | Mod: PT,,, | Performed by: STUDENT IN AN ORGANIZED HEALTH CARE EDUCATION/TRAINING PROGRAM

## 2025-06-23 PROCEDURE — 88305 TISSUE EXAM BY PATHOLOGIST: CPT | Mod: TC | Performed by: STUDENT IN AN ORGANIZED HEALTH CARE EDUCATION/TRAINING PROGRAM

## 2025-06-23 PROCEDURE — 27201012 HC FORCEPS, HOT/COLD, DISP: Performed by: STUDENT IN AN ORGANIZED HEALTH CARE EDUCATION/TRAINING PROGRAM

## 2025-06-23 PROCEDURE — 37000009 HC ANESTHESIA EA ADD 15 MINS: Performed by: STUDENT IN AN ORGANIZED HEALTH CARE EDUCATION/TRAINING PROGRAM

## 2025-06-23 PROCEDURE — 25000003 PHARM REV CODE 250: Performed by: STUDENT IN AN ORGANIZED HEALTH CARE EDUCATION/TRAINING PROGRAM

## 2025-06-23 PROCEDURE — 63600175 PHARM REV CODE 636 W HCPCS

## 2025-06-23 PROCEDURE — 45380 COLONOSCOPY AND BIOPSY: CPT | Mod: XS,PT,, | Performed by: STUDENT IN AN ORGANIZED HEALTH CARE EDUCATION/TRAINING PROGRAM

## 2025-06-23 PROCEDURE — 45385 COLONOSCOPY W/LESION REMOVAL: CPT | Mod: 33 | Performed by: STUDENT IN AN ORGANIZED HEALTH CARE EDUCATION/TRAINING PROGRAM

## 2025-06-23 PROCEDURE — 37000008 HC ANESTHESIA 1ST 15 MINUTES: Performed by: STUDENT IN AN ORGANIZED HEALTH CARE EDUCATION/TRAINING PROGRAM

## 2025-06-23 RX ORDER — SODIUM CHLORIDE 9 MG/ML
INJECTION, SOLUTION INTRAVENOUS CONTINUOUS
Status: DISCONTINUED | OUTPATIENT
Start: 2025-06-23 | End: 2025-06-23 | Stop reason: HOSPADM

## 2025-06-23 RX ORDER — LIDOCAINE HYDROCHLORIDE 20 MG/ML
INJECTION, SOLUTION EPIDURAL; INFILTRATION; INTRACAUDAL; PERINEURAL
Status: DISCONTINUED | OUTPATIENT
Start: 2025-06-23 | End: 2025-06-23

## 2025-06-23 RX ORDER — PROPOFOL 10 MG/ML
VIAL (ML) INTRAVENOUS
Status: DISCONTINUED | OUTPATIENT
Start: 2025-06-23 | End: 2025-06-23

## 2025-06-23 RX ADMIN — PROPOFOL 70 MG: 10 INJECTION, EMULSION INTRAVENOUS at 11:06

## 2025-06-23 RX ADMIN — PROPOFOL 25 MG: 10 INJECTION, EMULSION INTRAVENOUS at 11:06

## 2025-06-23 RX ADMIN — SODIUM CHLORIDE: 0.9 INJECTION, SOLUTION INTRAVENOUS at 11:06

## 2025-06-23 RX ADMIN — PROPOFOL 175 MCG/KG/MIN: 10 INJECTION, EMULSION INTRAVENOUS at 11:06

## 2025-06-23 RX ADMIN — LIDOCAINE HYDROCHLORIDE 50 MG: 20 INJECTION, SOLUTION EPIDURAL; INFILTRATION; INTRACAUDAL; PERINEURAL at 11:06

## 2025-06-23 NOTE — ANESTHESIA POSTPROCEDURE EVALUATION
Anesthesia Post Evaluation    Patient: Arpita Molina    Procedure(s) Performed: Procedure(s) (LRB):  COLONOSCOPY, SCREENING, LOW RISK PATIENT (N/A)    Final Anesthesia Type: general      Patient location during evaluation: GI PACU  Patient participation: Yes- Able to Participate  Level of consciousness: awake and alert  Post-procedure vital signs: reviewed and stable  Pain management: adequate  Airway patency: patent    PONV status at discharge: No PONV  Anesthetic complications: no      Cardiovascular status: blood pressure returned to baseline  Respiratory status: unassisted  Hydration status: euvolemic  Follow-up not needed.          Vitals Value Taken Time   BP 99/54 06/23/25 11:46   Temp 36.6 °C (97.9 °F) 06/23/25 11:46   Pulse 70 06/23/25 11:46   Resp 16 06/23/25 11:46   SpO2 99 % 06/23/25 11:46         No case tracking events are documented in the log.      Pain/Debra Score: Debra Score: 8 (6/23/2025 11:46 AM)

## 2025-06-23 NOTE — ANESTHESIA PREPROCEDURE EVALUATION
06/23/2025  Arpita Molina is a 50 y.o., female.    Past Medical History:   Diagnosis Date    Anemia     not currently taking anything for it    Depression     Endometriosis      Past Surgical History:   Procedure Laterality Date    laproscopy  2003, 2009    for endometriosis    TOTAL REDUCTION MAMMOPLASTY  12/2019    VAGINAL DELIVERY             Pre-op Assessment    I have reviewed the Patient Summary Reports.     I have reviewed the Nursing Notes. I have reviewed the NPO Status.   I have reviewed the Medications.     Review of Systems  Anesthesia Hx:  No problems with previous Anesthesia                Social:  Non-Smoker, Social Alcohol Use       Cardiovascular:  Exercise tolerance: good                                             Psych:  Psychiatric History                  Physical Exam  General: Well nourished, Alert and Oriented    Airway:  Mallampati: II / I  Mouth Opening: Normal  TM Distance: Normal  Tongue: Normal  Neck ROM: Normal ROM    Dental:  Intact, Braces    Chest/Lungs:  Clear to auscultation, Normal Respiratory Rate    Heart:  Rate: Normal  Rhythm: Regular Rhythm  Sounds: Normal        Anesthesia Plan  Type of Anesthesia, risks & benefits discussed:    Anesthesia Type: Gen Natural Airway  Intra-op Monitoring Plan: Standard ASA Monitors  Induction:  IV  Informed Consent: Informed consent signed with the Patient and all parties understand the risks and agree with anesthesia plan.  All questions answered.   ASA Score: 1  Day of Surgery Review of History & Physical: H&P Update referred to the surgeon/provider.    Ready For Surgery From Anesthesia Perspective.     .

## 2025-06-23 NOTE — H&P
Short Stay Endoscopy History and Physical    PCP - Negin Pryor MD  Referring Physician - Ashish Linda MD  [address] [facility] - Dept of Gastroenterology 4th floor, [place] [address]    Procedure - Colonoscopy  ASA - per anesthesia  Mallampati - per anesthesia  History of Anesthesia problems - no  Family history Anesthesia problems -  no   Plan of anesthesia - General    HPI:  This is a 50 y.o. female here for evaluation of: Colon cancer screening    Reflux - no  Dysphagia - no  Abdominal pain - no  Diarrhea - no    ROS:  Constitutional: No fevers, chills, No weight loss  CV: No chest pain  Pulm: No cough, No shortness of breath  GI: see HPI    Medical History:  has a past medical history of Anemia, Depression, and Endometriosis.    Surgical History:  has a past surgical history that includes laproscopy (2003, 2009); Vaginal delivery; and Total Reduction Mammoplasty (12/2019).    Family History: family history includes Breast cancer in her maternal aunt and maternal grandmother; Diabetes in her mother; Hypertension in her mother; Prostate cancer in her father..    Social History:  reports that she has never smoked. She has never used smokeless tobacco. She reports current alcohol use. She reports that she does not use drugs.    Review of patient's allergies indicates:  No Known Allergies    Medications:   Prescriptions Prior to Admission[1]    Physical Exam:    Vital Signs:   Vitals:    06/23/25 1051   BP: 112/65   Pulse:    Resp:    Temp:        General Appearance: Well appearing in no acute distress  Lungs: no labored breathing  CVS:  regular rate  Abdomen: non tender    Labs:  Lab Results   Component Value Date    WBC 5.40 10/09/2021    HGB 12.0 10/09/2021    HCT 37.0 10/09/2021     10/09/2021    CHOL 153 10/09/2021    TRIG 48 10/09/2021    HDL 46 10/09/2021    ALT 10 10/09/2021    AST 13 10/09/2021     10/09/2021    K 4.7 10/09/2021    CL  108 10/09/2021    CREATININE 0.8 10/09/2021    BUN 15 10/09/2021    CO2 26 10/09/2021    TSH 1.468 08/24/2021    HGBA1C 5.3 10/09/2021       I have explained the risks and benefits of this endoscopic procedure to the patient including but not limited to bleeding, inflammation, infection, perforation, and death.      SUSIE PANDYA MD         [1]   Medications Prior to Admission   Medication Sig Dispense Refill Last Dose/Taking    medroxyPROGESTERone (PROVERA) 10 MG tablet Take 1 tablet (10 mg total) by mouth once daily. 10 tablet 0     metFORMIN (GLUCOPHAGE) 500 MG tablet Take 1 tablet (500 mg total) by mouth 2 (two) times daily with meals. 60 tablet 0

## 2025-06-23 NOTE — TRANSFER OF CARE
"Anesthesia Transfer of Care Note    Patient: Arpita Molina    Procedure(s) Performed: Procedure(s) (LRB):  COLONOSCOPY, SCREENING, LOW RISK PATIENT (N/A)    Patient location: PACU    Anesthesia Type: general    Transport from OR: Transported from OR on room air with adequate spontaneous ventilation    Post pain: adequate analgesia    Post assessment: no apparent anesthetic complications and tolerated procedure well    Post vital signs: stable    Level of consciousness: awake    Nausea/Vomiting: no nausea/vomiting    Complications: none    Transfer of care protocol was followed      Last vitals: Visit Vitals  /65   Pulse (!) 53   Temp 36.6 °C (97.9 °F) (Temporal)   Resp 16   Ht 5' 3" (1.6 m)   Wt 68.1 kg (150 lb 2.1 oz)   SpO2 100%   Breastfeeding No   BMI 26.59 kg/m²     "

## 2025-06-23 NOTE — PROVATION PATIENT INSTRUCTIONS
Discharge Summary/Instructions after an Endoscopic Procedure  Patient Name: Arpita Molina  Patient MRN: [id number]  Patient YOB: 1975 Monday, June 23, 2025  Oren Graham MD  Dear patient,  As a result of recent federal legislation (The Federal Cures Act), you may   receive lab or pathology results from your procedure in your [facility]   account before your physician is able to contact you. Your physician or   their representative will relay the results to you with their   recommendations at their soonest availability.  Thank you,  RESTRICTIONS:  During your procedure today, you received medications for sedation.  These   medications may affect your judgment, balance and coordination.  Therefore,   for 24 hours, you have the following restrictions:   - DO NOT drive a car, operate machinery, make legal/financial decisions,   sign important papers or drink alcohol.    ACTIVITY:  Today: no heavy lifting, straining or running due to procedural   sedation/anesthesia.  The following day: return to full activity including work.  DIET:  Eat and drink normally unless instructed otherwise.     TREATMENT FOR COMMON SIDE EFFECTS:  - Mild abdominal pain, nausea, belching, bloating or excessive gas:  rest,   eat lightly and use a heating pad.  - Sore Throat: treat with throat lozenges and/or gargle with warm salt   water.  - Because air was used during the procedure, expelling large amounts of air   from your rectum or belching is normal.  - If a bowel prep was taken, you may not have a bowel movement for 1-3 days.    This is normal.  SYMPTOMS TO WATCH FOR AND REPORT TO YOUR PHYSICIAN:  1. Abdominal pain or bloating, other than gas cramps.  2. Chest pain.  3. Back pain.  4. Signs of infection such as: chills or fever occurring within 24 hours   after the procedure.  5. Rectal bleeding, which would show as bright red, maroon, or black stools.   (A tablespoon of blood from the rectum is not serious, especially if    hemorrhoids are present.)  6. Vomiting.  7. Weakness or dizziness.  GO DIRECTLY TO THE NEAREST EMERGENCY ROOM IF YOU HAVE ANY OF THE FOLLOWING:      Difficulty breathing              Chills and/or fever over 101 F   Persistent vomiting and/or vomiting blood   Severe abdominal pain   Severe chest pain   Black, tarry stools   Bleeding- more than one tablespoon   Any other symptom or condition that you feel may need urgent attention  Your doctor recommends these additional instructions:  If any biopsies were taken, your doctors clinic will contact you in 1 to 2   weeks with any results.  - Discharge patient to home (ambulatory).   - Patient has a contact number available for emergencies.  The signs and   symptoms of potential delayed complications were discussed with the   patient.  Return to normal activities tomorrow.  Written discharge   instructions were provided to the patient.   - Resume previous diet.   - Continue present medications.   - Return to primary care physician as previously scheduled.   - Repeat colonoscopy in 7 years for surveillance based on pathology results.     - Await pathology results.  For questions, problems or results please call your physician - Oren Graham MD at Work:  [phone].  [facility], EMERGENCY ROOM PHONE NUMBER: [phone]  IF A COMPLICATION OR EMERGENCY SITUATION ARISES AND YOU ARE UNABLE TO REACH   YOUR PHYSICIAN - GO DIRECTLY TO THE EMERGENCY ROOM.  MD Oren Bryson MD  6/23/2025 11:47:40 AM  This report has been verified and signed electronically.  Dear patient,  As a result of recent federal legislation (The Federal Cures Act), you may   receive lab or pathology results from your procedure in your [facility]   account before your physician is able to contact you. Your physician or   their representative will relay the results to you with their   recommendations at their soonest availability.  Thank you,  PROVATION

## 2025-06-24 ENCOUNTER — RESULTS FOLLOW-UP (OUTPATIENT)
Dept: GASTROENTEROLOGY | Facility: HOSPITAL | Age: 50
End: 2025-06-24
Payer: COMMERCIAL

## 2025-06-24 LAB
ESTROGEN SERPL-MCNC: NORMAL PG/ML
INSULIN SERPL-ACNC: NORMAL U[IU]/ML
LAB AP CLINICAL INFORMATION: NORMAL
LAB AP GROSS DESCRIPTION: NORMAL
LAB AP PERFORMING LOCATION(S): NORMAL
LAB AP REPORT FOOTNOTES: NORMAL

## 2025-06-24 NOTE — TELEPHONE ENCOUNTER
The following not was sent to the patient today via [employer]:      Dear Mrs. Molina:    It was my pleasure seeing you at the time of your recent colonoscopy at [facility].    I am happy to report the polyp(s) we removed were benign. The polyp(s) were Tubular adenoma without evidence of cancer or suspicious change.    Based on these findings and the findings at the time of your procedure, I recommend you follow-up with a repeat colonoscopy in approximately 7 Years    Please do not hesitate to contact me if you have any questions regarding this letter.    Sincerely,    Oren Graham M.D.